# Patient Record
Sex: MALE | Race: WHITE | NOT HISPANIC OR LATINO | Employment: FULL TIME | URBAN - NONMETROPOLITAN AREA
[De-identification: names, ages, dates, MRNs, and addresses within clinical notes are randomized per-mention and may not be internally consistent; named-entity substitution may affect disease eponyms.]

---

## 2017-07-06 ENCOUNTER — APPOINTMENT (OUTPATIENT)
Dept: OCCUPATIONAL MEDICINE | Facility: OTHER | Age: 37
End: 2017-07-06

## 2017-07-06 PROCEDURE — 92499 UNLISTED OPH SVC/PROCEDURE: CPT

## 2017-07-06 PROCEDURE — 99080 SPECIAL REPORTS OR FORMS: CPT

## 2017-07-06 PROCEDURE — 99499 UNLISTED E&M SERVICE: CPT

## 2017-07-06 PROCEDURE — 99000 SPECIMEN HANDLING OFFICE-LAB: CPT

## 2017-07-06 PROCEDURE — 94010 BREATHING CAPACITY TEST: CPT

## 2017-07-06 PROCEDURE — 99199 UNLISTED SPECIAL SVC PX/RPRT: CPT

## 2017-07-13 ENCOUNTER — APPOINTMENT (OUTPATIENT)
Dept: OCCUPATIONAL MEDICINE | Facility: OTHER | Age: 37
End: 2017-07-13

## 2017-11-25 ENCOUNTER — HOSPITAL ENCOUNTER (EMERGENCY)
Facility: HOSPITAL | Age: 37
Discharge: HOME | End: 2017-11-25
Attending: FAMILY MEDICINE | Admitting: FAMILY MEDICINE
Payer: COMMERCIAL

## 2017-11-25 ENCOUNTER — APPOINTMENT (OUTPATIENT)
Dept: GENERAL RADIOLOGY | Facility: HOSPITAL | Age: 37
End: 2017-11-25
Attending: FAMILY MEDICINE
Payer: COMMERCIAL

## 2017-11-25 VITALS
DIASTOLIC BLOOD PRESSURE: 82 MMHG | OXYGEN SATURATION: 96 % | SYSTOLIC BLOOD PRESSURE: 125 MMHG | TEMPERATURE: 99.2 F | RESPIRATION RATE: 16 BRPM | HEART RATE: 80 BPM

## 2017-11-25 DIAGNOSIS — S42.001A CLOSED NONDISPLACED FRACTURE OF RIGHT CLAVICLE, UNSPECIFIED PART OF CLAVICLE, INITIAL ENCOUNTER: ICD-10-CM

## 2017-11-25 PROCEDURE — 25000128 H RX IP 250 OP 636: Performed by: FAMILY MEDICINE

## 2017-11-25 PROCEDURE — 99283 EMERGENCY DEPT VISIT LOW MDM: CPT | Performed by: FAMILY MEDICINE

## 2017-11-25 PROCEDURE — 96374 THER/PROPH/DIAG INJ IV PUSH: CPT

## 2017-11-25 PROCEDURE — 99284 EMERGENCY DEPT VISIT MOD MDM: CPT | Mod: 25

## 2017-11-25 PROCEDURE — 96376 TX/PRO/DX INJ SAME DRUG ADON: CPT

## 2017-11-25 PROCEDURE — 73030 X-RAY EXAM OF SHOULDER: CPT | Mod: TC,RT

## 2017-11-25 RX ORDER — HYDROMORPHONE HYDROCHLORIDE 1 MG/ML
0.5 INJECTION, SOLUTION INTRAMUSCULAR; INTRAVENOUS; SUBCUTANEOUS
Status: COMPLETED | OUTPATIENT
Start: 2017-11-25 | End: 2017-11-25

## 2017-11-25 RX ORDER — HYDROCODONE BITARTRATE AND ACETAMINOPHEN 5; 325 MG/1; MG/1
1-2 TABLET ORAL EVERY 4 HOURS PRN
Qty: 15 TABLET | Refills: 0 | Status: SHIPPED | OUTPATIENT
Start: 2017-11-25 | End: 2018-04-24

## 2017-11-25 RX ADMIN — HYDROMORPHONE HYDROCHLORIDE 0.5 MG: 1 INJECTION, SOLUTION INTRAMUSCULAR; INTRAVENOUS; SUBCUTANEOUS at 16:40

## 2017-11-25 RX ADMIN — HYDROMORPHONE HYDROCHLORIDE 0.5 MG: 1 INJECTION, SOLUTION INTRAMUSCULAR; INTRAVENOUS; SUBCUTANEOUS at 17:35

## 2017-11-25 RX ADMIN — HYDROMORPHONE HYDROCHLORIDE 0.5 MG: 1 INJECTION, SOLUTION INTRAMUSCULAR; INTRAVENOUS; SUBCUTANEOUS at 17:00

## 2017-11-25 NOTE — ED AVS SNAPSHOT
[facility]    [address]    [place] [address]    Phone:  [phone]                                       Frankie Troncoso   MRN: [id number]    Department:  [facility]   Date of Visit:  11/25/2017           Patient Information     Date Of Birth          1980        Your diagnoses for this visit were:     Closed nondisplaced fracture of right clavicle, unspecified part of clavicle, initial encounter        You were seen by Bibiana Myers MD.        Discharge Instructions         Collarbone Fracture  You have a break (fracture) in your collarbone (clavicle). This will cause swelling, pain, and bruising. The first few weeks will be the most painful. This is because deep breathing, coughing, or changing position from sitting to lying down may cause the broken ends to move slightly.   The fracture will heal in about 4 to 6 weeks. Most people can return to normal activities in about 3 months. In children, this injury will heal by reshaping the bone back to normal. In adults, a noticeable bump in the bone may remain.  Treatment is with a sling or a special type of arm sling called a shoulder immobilizer. This supports your arm and eases pain.  Home care  Follow these guidelines when caring for yourself or your child at home:    Put an ice pack on the injured area. Do this for 20 minutes every 1 to 2 hours on the first day. You can make an ice pack by wrapping a plastic bag of ice cubes in a thin towel. Keep using the ice pack 3 to 4 times a day for the next 2 days. Then use it as needed to ease pain and swelling.    If you were given a sling or shoulder immobilizer, wear it for comfort. You may take it off when you bathe or sleep. Take your arm out of the sling for a little while each day and move your shoulder, elbow, wrist, and hand to keep them from getting stiff.    Don t do any heavy lifting or raise the injured arm overhead until you are pain-free. Your child  shouldn t play sports or do physical education class for at least 4 weeks, or until the healthcare provider says it s OK to do so.    You may use acetaminophen or ibuprofen to control pain, unless another pain medicine was prescribed. If you have chronic liver or kidney disease, talk with your healthcare provider before using these medicines. Also talk with your provider if you ve had a stomach ulcer or gastrointestinal bleeding.    Your doctor may refer you to physical therapy for shoulder exercises once it starts to heal.    You may need surgery if the bones are out of place (displaced). Surgery will put them in better alignment while they heal. This leads to better strength when you have healed.  Follow-up care  Follow up with your healthcare provider within 1 week, or as advised. This is to be sure the bone is healing the way it should.  X-rays are occasionally taken of the fracture. You will be told of any new findings that may affect your care.  When to seek medical advice  Call your healthcare provider right away if any of these occur:    Swelling in your collarbone gets worse or the skin in the area becomes pale or discolored    Large area of bruising over the collarbone    Fingers become swollen, cold, blue, numb, or tingly    Shortness of breath, dizziness, or general weakness    Weakness or swelling in your arm    Any redness, drainage, or pus coming from the wound  Date Last Reviewed: 1/1/2017 2000-2017 [employer]. [address] [place], [place] [id number]. All rights reserved. This information is not intended as a substitute for professional medical care. Always follow your healthcare professional's instructions.             Review of your medicines      START taking        Dose / Directions Last dose taken    HYDROcodone-acetaminophen 5-325 MG per tablet   Commonly known as:  NORCO   Dose:  1-2 tablet   Quantity:  15 tablet        Take 1-2 tablets by mouth every 4 hours as needed for  moderate to severe pain   Refills:  0          Our records show that you are taking the medicines listed below. If these are incorrect, please call your family doctor or clinic.        Dose / Directions Last dose taken    albuterol 108 (90 BASE) MCG/ACT Inhaler   Commonly known as:  PROAIR HFA/PROVENTIL HFA/VENTOLIN HFA   Dose:  2 puff   Quantity:  1 Inhaler        Inhale 2 puffs into the lungs every 6 hours as needed for shortness of breath / dyspnea   Refills:  prn        blood glucose monitoring test strip   Commonly known as:  ACCU-CHEK CHERELLE   Quantity:  1 Box        USE STRIP TO CHECK GLUCOSE TWICE DAILY   Refills:  5        CINNAMON PO        Refills:  0        fish oil-omega-3 fatty acids 1000 MG capsule   Dose:  1 capsule        Take 1 capsule by mouth 2 times daily   Refills:  0        insulin glargine 100 UNIT/ML injection   Commonly known as:  LANTUS        Inject Subcutaneous At Bedtime   Refills:  0        LISINOPRIL PO   Dose:  2.5 mg        Take 2.5 mg by mouth daily   Refills:  0        NOVOLOG SC        Refills:  0        simvastatin 40 MG tablet   Commonly known as:  ZOCOR   Dose:  1 tablet        Take 1 tablet by mouth every evening   Refills:  0        VITAMIN E        Refills:  0                Prescriptions were sent or printed at these locations (1 Prescription)                   [id number] - [place], [place] - [address] [facility] [id number] [facility] [address], [place] [address]    Telephone:  [phone]   Fax:  [phone]   Hours:                  Printed at Department/Unit printer (1 of 1)         HYDROcodone-acetaminophen (NORCO) 5-325 MG per tablet                Procedures and tests performed during your visit     Shoulder XR, G/E 3 views, right      Orders Needing Specimen Collection     None      Pending Results     No orders found from 11/23/2017 to 11/26/2017.            Pending Culture Results     No orders found from 11/23/2017 to 11/26/2017.            Thank you for choosing [facility]      "  Thank you for choosing [facility] for your care. Our goal is always to provide you with excellent care. Hearing back from our patients is one way we can continue to improve our services. Please take a few minutes to complete the written survey that you may receive in the mail after you visit with us. Thank you!        [facility] Information     [employer] lets you send messages to your doctor, view your test results, renew your prescriptions, schedule appointments and more. To sign up, go to [email or web address] . Click on \"Log in\" on the left side of the screen, which will take you to the Welcome page. Then click on \"Sign up Now\" on the right side of the page.     You will be asked to enter the access code listed below, as well as some personal information. Please follow the directions to create your username and password.     Your access code is: [email or web address]  Expires: 2018  5:35 PM     Your access code will  in 90 days. If you need help or a new code, please call your [facility] clinic or [phone].        Care EveryWhere ID     This is your Care EveryWhere ID. This could be used by other organizations to access your [facility] medical records  [id number]        Equal Access to Services     TIFFANIE DIAZ AH: Tory meltono Somadelyn, waaxda luqadaha, qaybta kaalmada adeegyada, berenice lott. So [facility] [phone].    ATENCIÓN: Si habla español, tiene a bautista disposición servicios gratuitos de asistencia lingüística. Llame al [phone].    We comply with applicable federal civil rights laws and [place] laws. We do not discriminate on the basis of race, color, national origin, age, disability, sex, sexual orientation, or gender identity.            After Visit Summary       This is your record. Keep this with you and show to your community pharmacist(s) and doctor(s) at your next visit.                  "
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
## 2017-11-25 NOTE — DISCHARGE INSTRUCTIONS
Collarbone Fracture  You have a break (fracture) in your collarbone (clavicle). This will cause swelling, pain, and bruising. The first few weeks will be the most painful. This is because deep breathing, coughing, or changing position from sitting to lying down may cause the broken ends to move slightly.   The fracture will heal in about 4 to 6 weeks. Most people can return to normal activities in about 3 months. In children, this injury will heal by reshaping the bone back to normal. In adults, a noticeable bump in the bone may remain.  Treatment is with a sling or a special type of arm sling called a shoulder immobilizer. This supports your arm and eases pain.  Home care  Follow these guidelines when caring for yourself or your child at home:    Put an ice pack on the injured area. Do this for 20 minutes every 1 to 2 hours on the first day. You can make an ice pack by wrapping a plastic bag of ice cubes in a thin towel. Keep using the ice pack 3 to 4 times a day for the next 2 days. Then use it as needed to ease pain and swelling.    If you were given a sling or shoulder immobilizer, wear it for comfort. You may take it off when you bathe or sleep. Take your arm out of the sling for a little while each day and move your shoulder, elbow, wrist, and hand to keep them from getting stiff.    Don t do any heavy lifting or raise the injured arm overhead until you are pain-free. Your child shouldn t play sports or do physical education class for at least 4 weeks, or until the healthcare provider says it s OK to do so.    You may use acetaminophen or ibuprofen to control pain, unless another pain medicine was prescribed. If you have chronic liver or kidney disease, talk with your healthcare provider before using these medicines. Also talk with your provider if you ve had a stomach ulcer or gastrointestinal bleeding.    Your doctor may refer you to physical therapy for shoulder exercises once it starts to heal.    You  may need surgery if the bones are out of place (displaced). Surgery will put them in better alignment while they heal. This leads to better strength when you have healed.  Follow-up care  Follow up with your healthcare provider within 1 week, or as advised. This is to be sure the bone is healing the way it should.  X-rays are occasionally taken of the fracture. You will be told of any new findings that may affect your care.  When to seek medical advice  Call your healthcare provider right away if any of these occur:    Swelling in your collarbone gets worse or the skin in the area becomes pale or discolored    Large area of bruising over the collarbone    Fingers become swollen, cold, blue, numb, or tingly    Shortness of breath, dizziness, or general weakness    Weakness or swelling in your arm    Any redness, drainage, or pus coming from the wound  Date Last Reviewed: 1/1/2017 2000-2017 [employer]. [address] [place], [place] [address]. All rights reserved. This information is not intended as a substitute for professional medical care. Always follow your healthcare professional's instructions.

## 2017-11-25 NOTE — ED AVS SNAPSHOT
[facility]    [address] [place] [address]    Phone:  [phone]                                       Frankie Troncoso   MRN: [id number]    Department:  [facility]   Date of Visit:  11/25/2017           After Visit Summary Signature Page     I have received my discharge instructions, and my questions have been answered. I have discussed any challenges I see with this plan with the nurse or doctor.    ..........................................................................................................................................  Patient/Patient Representative Signature      ..........................................................................................................................................  Patient Representative Print Name and Relationship to Patient    ..................................................               ................................................  Date                                            Time    ..........................................................................................................................................  Reviewed by Signature/Title    ...................................................              ..............................................  Date                                                            Time
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
## 2018-04-24 ENCOUNTER — HOSPITAL ENCOUNTER (EMERGENCY)
Facility: HOSPITAL | Age: 38
Discharge: HOME | End: 2018-04-24
Attending: PHYSICIAN ASSISTANT | Admitting: PHYSICIAN ASSISTANT
Payer: COMMERCIAL

## 2018-04-24 VITALS
RESPIRATION RATE: 19 BRPM | SYSTOLIC BLOOD PRESSURE: 136 MMHG | OXYGEN SATURATION: 97 % | HEART RATE: 95 BPM | DIASTOLIC BLOOD PRESSURE: 89 MMHG | TEMPERATURE: 96.9 F

## 2018-04-24 DIAGNOSIS — M76.892 TENDINITIS OF LEFT KNEE: ICD-10-CM

## 2018-04-24 PROCEDURE — 99213 OFFICE O/P EST LOW 20 MIN: CPT | Performed by: PHYSICIAN ASSISTANT

## 2018-04-24 PROCEDURE — G0463 HOSPITAL OUTPT CLINIC VISIT: HCPCS

## 2018-04-24 RX ORDER — PREDNISONE 20 MG/1
TABLET ORAL
Qty: 10 TABLET | Refills: 0 | Status: SHIPPED | OUTPATIENT
Start: 2018-04-24 | End: 2018-11-01

## 2018-04-24 ASSESSMENT — ENCOUNTER SYMPTOMS
CARDIOVASCULAR NEGATIVE: 1
PSYCHIATRIC NEGATIVE: 1
MYALGIAS: 1
CONSTITUTIONAL NEGATIVE: 1

## 2018-04-24 NOTE — ED AVS SNAPSHOT
[facility]    [address] [place] [address]    Phone:  [phone]                                       Frankie Troncoso   MRN: [id number]    Department:  [facility]   Date of Visit:  4/24/2018           After Visit Summary Signature Page     I have received my discharge instructions, and my questions have been answered. I have discussed any challenges I see with this plan with the nurse or doctor.    ..........................................................................................................................................  Patient/Patient Representative Signature      ..........................................................................................................................................  Patient Representative Print Name and Relationship to Patient    ..................................................               ................................................  Date                                            Time    ..........................................................................................................................................  Reviewed by Signature/Title    ...................................................              ..............................................  Date                                                            Time

## 2018-04-24 NOTE — ED PROVIDER NOTES
History     Chief Complaint   Patient presents with     Knee Pain     left knee pain for 3 weeks     The history is provided by the patient. No  was used.     Frankie Troncoso is a 38 year old male who       Problem List:    Patient Active Problem List    Diagnosis Date Noted     Diabetes mellitus type 1 (H) 07/16/2014     Priority: Medium     Allergic rhinitis due to pollen 01/21/2014     Priority: Medium     Seasonal allergic rhinitis 12/03/2013     Priority: Medium     Sleep-disordered breathing 12/03/2013     Priority: Medium        Past Medical History:    History reviewed. No pertinent past medical history.    Past Surgical History:    History reviewed. No pertinent surgical history.    Family History:    Family History   Problem Relation Age of Onset     Alcohol/Drug Father      alcoholism     C.A.D. Father      CEREBROVASCULAR DISEASE Father      CVA     Allergies Father      Asthma Father      HEART DISEASE Other      heart disease - family h/o       Social History:  Marital Status:   [2]  Social History   Substance Use Topics     Smoking status: Former Smoker     Packs/day: 2.00     Years: 10.00     Types: Cigarettes     Smokeless tobacco: Never Used     Alcohol use Yes        Medications:      albuterol (PROAIR HFA, PROVENTIL HFA, VENTOLIN HFA) 108 (90 BASE) MCG/ACT inhaler   blood glucose (ACCU-CHEK CHERELLE) test strip   CINNAMON PO   fish oil-omega-3 fatty acids (FISH OIL) 1000 MG capsule   Insulin Aspart (NOVOLOG SC)   insulin glargine (LANTUS) 100 UNIT/ML injection   LISINOPRIL PO   predniSONE (DELTASONE) 20 MG tablet   simvastatin (ZOCOR) 40 MG tablet   VITAMIN E         Review of Systems   Constitutional: Negative.    Cardiovascular: Negative.    Musculoskeletal: Positive for gait problem and myalgias.   Psychiatric/Behavioral: Negative.        Physical Exam   BP: 136/89  Pulse: 95  Temp: 96.9  F (36.1  C)  Resp: 19  SpO2: 97 %      Physical Exam   Constitutional: He is  oriented to person, place, and time. He appears well-developed and well-nourished. No distress.   Cardiovascular: Normal rate.    Pulmonary/Chest: Effort normal.   Musculoskeletal:   Left knee: moderate TTP to distal medial quad. Neg v/v/d. +AFROM w/ pain. 4/5 strength due to pain. No e/e/e/e. M/n/v intact   Neurological: He is alert and oriented to person, place, and time.   Skin: He is not diaphoretic.   Psychiatric: He has a normal mood and affect.   Nursing note and vitals reviewed.      ED Course     ED Course     Procedures            Assessments & Plan (with Medical Decision Making)     I have reviewed the nursing notes.    I have reviewed the findings, diagnosis, plan and need for follow up with the patient.      Discharge Medication List as of 4/24/2018  9:55 AM      START taking these medications    Details   predniSONE (DELTASONE) 20 MG tablet Take two tablets (= 40mg) each day for 5 (five) days, Disp-10 tablet, R-0, E-Prescribe             Final diagnoses:   Tendinitis of left knee           Elevate injured area above heart as often as possible and when resting.  Apply ice at least three times a day x 20 minutes.   Patient verbally educated and given appropriate education sheets for the diagnoses and has no questions.  Take medications as directed.   Follow up with your Primary Care provider if symptoms increase or if concerns develop, return to the ER  Erin Pierce Certified   Physician Assistant  4/24/2018  6:52 PM  URGENT CARE CLINIC    4/24/2018   [facility]     Erin Pierce PA  04/24/18 [id number]

## 2018-04-24 NOTE — ED AVS SNAPSHOT
[facility]    [address] [address]    Phone:  [phone]                                       Frankie Troncoso   MRN: [id number]    Department:  [facility]   Date of Visit:  4/24/2018           Patient Information     Date Of Birth          1980        Your diagnoses for this visit were:     Tendinitis of left knee        You were seen by Erin Pierce PA.      Follow-up Information     Please follow up.    [facility]  [facility]. You may need Physical Therapy.        Follow up with [facility].    Specialty:  EMERGENCY MEDICINE    Why:  If further concerns develop    Contact information:    [address] [address] [phone]    Additional information:    From [facility]: Take [address]. Turn left at [address]. Turn left at the first stoplight on [address]. At the first stop sign, take a right onto [address]. Take a left into the parking lot and continue through until you reach the North enterance of the building.       From [place]: Take [address]. Take the [address] ramp towards [place]. Turn left onto [address]. Take a slight right onto [address] [email or web address] Turn left at the first stoplight on [address]. At the first stop sign, take a right onto [address]. Take a left into the parking lot and continue through until you reach the North enterance of the building.       From [place]: Take [address]. Take a right at [address]. At the first stop sign, take a right onto [address]. Take a left into the parking lot and continue through until you reach the North enterance of the building.       Discharge References/Attachments     KNEE PAIN (ENGLISH)         Review of your medicines      START taking        Dose / Directions Last dose taken    predniSONE 20 MG tablet   Commonly known as:  DELTASONE   Quantity:   10 tablet        Take two tablets (= 40mg) each day for 5 (five) days   Refills:  0          Our records show that you are taking the medicines listed below. If these are incorrect, please call your family doctor or clinic.        Dose / Directions Last dose taken    albuterol 108 (90 Base) MCG/ACT Inhaler   Commonly known as:  PROAIR HFA/PROVENTIL HFA/VENTOLIN HFA   Dose:  2 puff   Quantity:  1 Inhaler        Inhale 2 puffs into the lungs every 6 hours as needed for shortness of breath / dyspnea   Refills:  prn        blood glucose monitoring test strip   Commonly known as:  ACCU-CHEK CHERELLE   Quantity:  1 Box        USE STRIP TO CHECK GLUCOSE TWICE DAILY   Refills:  5        CINNAMON PO        Refills:  0        fish oil-omega-3 fatty acids 1000 MG capsule   Dose:  1 capsule        Take 1 capsule by mouth 2 times daily   Refills:  0        insulin glargine 100 UNIT/ML injection   Commonly known as:  LANTUS        Inject Subcutaneous At Bedtime   Refills:  0        LISINOPRIL PO   Dose:  2.5 mg        Take 2.5 mg by mouth daily   Refills:  0        NOVOLOG SC        Refills:  0        simvastatin 40 MG tablet   Commonly known as:  ZOCOR   Dose:  1 tablet        Take 1 tablet by mouth every evening   Refills:  0        VITAMIN E        Refills:  0                Prescriptions were sent or printed at these locations (1 Prescription)                   [facility] [id number] - [address] [facility] [address]   [address] [facility] [address], [place] [address]    Telephone:  [phone]   Fax:  [phone]   Hours:                  E-Prescribed (1 of 1)         predniSONE (DELTASONE) 20 MG tablet                Orders Needing Specimen Collection     None      Pending Results     No orders found from 4/22/2018 to 4/25/2018.            Pending Culture Results     No orders found from 4/22/2018 to 4/25/2018.            Thank you for choosing [facility]       Thank you for choosing [facility] for your care. Our goal is always to provide you with  "excellent care. Hearing back from our patients is one way we can continue to improve our services. Please take a few minutes to complete the written survey that you may receive in the mail after you visit with us. Thank you!        Smartpay Information     Smartpay lets you send messages to your doctor, view your test results, renew your prescriptions, schedule appointments and more. To sign up, go to [email or web address] . Click on \"Log in\" on the left side of the screen, which will take you to the Welcome page. Then click on \"Sign up Now\" on the right side of the page.     You will be asked to enter the access code listed below, as well as some personal information. Please follow the directions to create your username and password.     Your access code is: [email or web address]  Expires: 2018  9:55 AM     Your access code will  in 90 days. If you need help or a new code, please call your [facility] clinic or [phone].        Care EveryWhere ID     This is your Care EveryWhere ID. This could be used by other organizations to access your [facility] medical records  [id number]        Equal Access to Services     TIFFANIE DIAZ AH: Hadii elmer Jameson, waashleigh clements, qaybta kaalmadamián cornell, berenice lott. So [facility] [phone].    ATENCIÓN: Si habla español, tiene a bautista disposición servicios gratuitos de asistencia lingüística. Carol al [phone].    We comply with applicable federal civil rights laws and [place] laws. We do not discriminate on the basis of race, color, national origin, age, disability, sex, sexual orientation, or gender identity.            After Visit Summary       This is your record. Keep this with you and show to your community pharmacist(s) and doctor(s) at your next visit.                  "

## 2018-05-17 ENCOUNTER — APPOINTMENT (OUTPATIENT)
Dept: OCCUPATIONAL MEDICINE | Facility: OTHER | Age: 38
End: 2018-05-17

## 2018-05-17 PROCEDURE — 99000 SPECIMEN HANDLING OFFICE-LAB: CPT

## 2018-05-17 PROCEDURE — 99199 UNLISTED SPECIAL SVC PX/RPRT: CPT

## 2018-11-01 ENCOUNTER — APPOINTMENT (OUTPATIENT)
Dept: CT IMAGING | Facility: HOSPITAL | Age: 38
End: 2018-11-01
Attending: FAMILY MEDICINE

## 2018-11-01 ENCOUNTER — HOSPITAL ENCOUNTER (EMERGENCY)
Facility: HOSPITAL | Age: 38
Discharge: HOME | End: 2018-11-01
Attending: FAMILY MEDICINE | Admitting: FAMILY MEDICINE

## 2018-11-01 VITALS
SYSTOLIC BLOOD PRESSURE: 122 MMHG | OXYGEN SATURATION: 96 % | TEMPERATURE: 97 F | RESPIRATION RATE: 12 BRPM | DIASTOLIC BLOOD PRESSURE: 77 MMHG

## 2018-11-01 DIAGNOSIS — K52.9 GASTROENTERITIS: ICD-10-CM

## 2018-11-01 LAB
ALBUMIN SERPL-MCNC: 3.7 G/DL (ref 3.4–5)
ALBUMIN UR-MCNC: NEGATIVE MG/DL
ALP SERPL-CCNC: 71 U/L (ref 40–150)
ALT SERPL W P-5'-P-CCNC: 33 U/L (ref 0–70)
ANION GAP SERPL CALCULATED.3IONS-SCNC: 8 MMOL/L (ref 3–14)
APPEARANCE UR: CLEAR
AST SERPL W P-5'-P-CCNC: 25 U/L (ref 0–45)
BACTERIA #/AREA URNS HPF: ABNORMAL /HPF
BASOPHILS # BLD AUTO: 0 10E9/L (ref 0–0.2)
BASOPHILS NFR BLD AUTO: 0.2 %
BILIRUB SERPL-MCNC: 0.4 MG/DL (ref 0.2–1.3)
BILIRUB UR QL STRIP: NEGATIVE
BUN SERPL-MCNC: 12 MG/DL (ref 7–30)
CALCIUM SERPL-MCNC: 8.8 MG/DL (ref 8.5–10.1)
CHLORIDE SERPL-SCNC: 105 MMOL/L (ref 94–109)
CO2 SERPL-SCNC: 23 MMOL/L (ref 20–32)
COLOR UR AUTO: YELLOW
CREAT SERPL-MCNC: 0.94 MG/DL (ref 0.66–1.25)
DIFFERENTIAL METHOD BLD: NORMAL
EOSINOPHIL # BLD AUTO: 0.4 10E9/L (ref 0–0.7)
EOSINOPHIL NFR BLD AUTO: 3.6 %
ERYTHROCYTE [DISTWIDTH] IN BLOOD BY AUTOMATED COUNT: 14.1 % (ref 10–15)
EST. AVERAGE GLUCOSE BLD GHB EST-MCNC: 151 MG/DL
GFR SERPL CREATININE-BSD FRML MDRD: 89 ML/MIN/1.7M2
GLUCOSE SERPL-MCNC: 269 MG/DL (ref 70–99)
GLUCOSE UR STRIP-MCNC: >1000 MG/DL
HBA1C MFR BLD: 6.9 % (ref 0–5.6)
HCT VFR BLD AUTO: 43 % (ref 40–53)
HGB BLD-MCNC: 14.4 G/DL (ref 13.3–17.7)
HGB UR QL STRIP: NEGATIVE
IMM GRANULOCYTES # BLD: 0 10E9/L (ref 0–0.4)
IMM GRANULOCYTES NFR BLD: 0.3 %
KETONES UR STRIP-MCNC: 10 MG/DL
LEUKOCYTE ESTERASE UR QL STRIP: NEGATIVE
LYMPHOCYTES # BLD AUTO: 2 10E9/L (ref 0.8–5.3)
LYMPHOCYTES NFR BLD AUTO: 19.3 %
MCH RBC QN AUTO: 29 PG (ref 26.5–33)
MCHC RBC AUTO-ENTMCNC: 33.5 G/DL (ref 31.5–36.5)
MCV RBC AUTO: 87 FL (ref 78–100)
MONOCYTES # BLD AUTO: 0.8 10E9/L (ref 0–1.3)
MONOCYTES NFR BLD AUTO: 7.6 %
NEUTROPHILS # BLD AUTO: 7.2 10E9/L (ref 1.6–8.3)
NEUTROPHILS NFR BLD AUTO: 69 %
NITRATE UR QL: NEGATIVE
NRBC # BLD AUTO: 0 10*3/UL
NRBC BLD AUTO-RTO: 0 /100
PH UR STRIP: 6.5 PH (ref 4.7–8)
PLATELET # BLD AUTO: 329 10E9/L (ref 150–450)
POTASSIUM SERPL-SCNC: 4.4 MMOL/L (ref 3.4–5.3)
PROT SERPL-MCNC: 7.7 G/DL (ref 6.8–8.8)
RBC # BLD AUTO: 4.97 10E12/L (ref 4.4–5.9)
RBC #/AREA URNS AUTO: 0 /HPF (ref 0–2)
SODIUM SERPL-SCNC: 136 MMOL/L (ref 133–144)
SOURCE: ABNORMAL
SP GR UR STRIP: 1.03 (ref 1–1.03)
UROBILINOGEN UR STRIP-MCNC: NORMAL MG/DL (ref 0–2)
WBC # BLD AUTO: 10.5 10E9/L (ref 4–11)
WBC #/AREA URNS AUTO: <1 /HPF (ref 0–5)

## 2018-11-01 PROCEDURE — 83036 HEMOGLOBIN GLYCOSYLATED A1C: CPT | Performed by: FAMILY MEDICINE

## 2018-11-01 PROCEDURE — 99284 EMERGENCY DEPT VISIT MOD MDM: CPT | Performed by: FAMILY MEDICINE

## 2018-11-01 PROCEDURE — 25000128 H RX IP 250 OP 636: Performed by: FAMILY MEDICINE

## 2018-11-01 PROCEDURE — 99284 EMERGENCY DEPT VISIT MOD MDM: CPT | Mod: 25

## 2018-11-01 PROCEDURE — 96361 HYDRATE IV INFUSION ADD-ON: CPT

## 2018-11-01 PROCEDURE — 81001 URINALYSIS AUTO W/SCOPE: CPT | Performed by: FAMILY MEDICINE

## 2018-11-01 PROCEDURE — 36415 COLL VENOUS BLD VENIPUNCTURE: CPT | Performed by: FAMILY MEDICINE

## 2018-11-01 PROCEDURE — 40000788 ZZHCL STATISTIC ESTIMATED AVERAGE GLUCOSE: Performed by: FAMILY MEDICINE

## 2018-11-01 PROCEDURE — 96360 HYDRATION IV INFUSION INIT: CPT

## 2018-11-01 PROCEDURE — 74176 CT ABD & PELVIS W/O CONTRAST: CPT | Mod: TC

## 2018-11-01 PROCEDURE — 80053 COMPREHEN METABOLIC PANEL: CPT | Performed by: FAMILY MEDICINE

## 2018-11-01 PROCEDURE — 85025 COMPLETE CBC W/AUTO DIFF WBC: CPT | Performed by: FAMILY MEDICINE

## 2018-11-01 RX ORDER — ONDANSETRON 4 MG/1
4 TABLET, ORALLY DISINTEGRATING ORAL EVERY 8 HOURS PRN
Qty: 20 TABLET | Refills: 0 | Status: SHIPPED | OUTPATIENT
Start: 2018-11-01 | End: 2021-02-18

## 2018-11-01 RX ORDER — ONDANSETRON 2 MG/ML
4 INJECTION INTRAMUSCULAR; INTRAVENOUS ONCE
Status: DISCONTINUED | OUTPATIENT
Start: 2018-11-01 | End: 2018-11-01 | Stop reason: HOSPADM

## 2018-11-01 RX ADMIN — SODIUM CHLORIDE 1000 ML: 9 INJECTION, SOLUTION INTRAVENOUS at 02:58

## 2018-11-01 NOTE — ED AVS SNAPSHOT
[facility]    [address]    [place] [address]    Phone:  [phone]                                       Frankie Troncoso   MRN: [id number]    Department:  [facility]   Date of Visit:  11/1/2018           Patient Information     Date Of Birth          1980        Your diagnoses for this visit were:     Gastroenteritis        You were seen by Yosvany De Jesus DO.      Follow-up Information     Follow up with Clinic, [facility] In 3 days.    Contact information:    [address]  [address]  [place] [address]  [phone]          Discharge Instructions       Final diagnoses:   Gastroenteritis     Drink four pints (64 fluid ounces) of plain water daily. Eat dry plain cheerios and applesauce until feeling better. Then progress diet back to normal as tolerated. If not feeling 100% better in 48 hours, follow up with your doctor in 48 to 72 hours. In the meanwhile, if worsening pain or symptoms of illness, immediately return to the emergency department.         Review of your medicines      START taking        Dose / Directions Last dose taken    ondansetron 4 MG ODT tab   Commonly known as:  ZOFRAN ODT   Dose:  4 mg   Quantity:  20 tablet        Take 1 tablet (4 mg) by mouth every 8 hours as needed for nausea or vomiting   Refills:  0          Our records show that you are taking the medicines listed below. If these are incorrect, please call your family doctor or clinic.        Dose / Directions Last dose taken    albuterol 108 (90 Base) MCG/ACT inhaler   Commonly known as:  PROAIR HFA/PROVENTIL HFA/VENTOLIN HFA   Dose:  2 puff   Quantity:  1 Inhaler        Inhale 2 puffs into the lungs every 6 hours as needed for shortness of breath / dyspnea   Refills:  prn        blood glucose monitoring test strip   Commonly known as:  ACCU-CHEK CHERELLE   Quantity:  1 Box        USE STRIP TO CHECK GLUCOSE TWICE DAILY   Refills:  5        CINNAMON PO        Refills:  0         fish oil-omega-3 fatty acids 1000 MG capsule   Dose:  1 capsule        Take 1 capsule by mouth 2 times daily   Refills:  0        insulin glargine 100 UNIT/ML injection   Commonly known as:  LANTUS        Inject Subcutaneous At Bedtime   Refills:  0        LISINOPRIL PO   Dose:  2.5 mg        Take 2.5 mg by mouth daily   Refills:  0        NOVOLOG SC        Refills:  0        simvastatin 40 MG tablet   Commonly known as:  ZOCOR   Dose:  1 tablet        Take 1 tablet by mouth every evening   Refills:  0        VITAMIN E        Refills:  0                Prescriptions were sent or printed at these locations (1 Prescription)                   Other Prescriptions                Printed at Department/Unit printer (1 of 1)         ondansetron (ZOFRAN ODT) 4 MG ODT tab                Procedures and tests performed during your visit     Procedure/Test Number of Times Performed    Abd/pelvis CT - no contrast - Stone Protocol 1    CBC with platelets differential 1    Comprehensive metabolic panel 1    Estimated Average Glucose 2    Hemoglobin A1c 1    UA with Microscopic reflex to Culture 1      Orders Needing Specimen Collection     None      Pending Results     Date and Time Order Name Status Description    11/1/2018 0259 Estimated Average Glucose In process     11/1/2018 0257 Abd/pelvis CT - no contrast - Stone Protocol In process             Pending Culture Results     No orders found from 10/30/2018 to 11/2/2018.            Thank you for choosing [facility]       Thank you for choosing [facility] for your care. Our goal is always to provide you with excellent care. Hearing back from our patients is one way we can continue to improve our services. Please take a few minutes to complete the written survey that you may receive in the mail after you visit with us. Thank you!        Publicatehart Information     RAZ Mobile lets you send messages to your doctor, view your test results, renew your prescriptions, schedule appointments  "and more. To sign up, go to [email or web address] . Click on \"Log in\" on the left side of the screen, which will take you to the Welcome page. Then click on \"Sign up Now\" on the right side of the page.     You will be asked to enter the access code listed below, as well as some personal information. Please follow the directions to create your username and password.     Your access code is: [email or web address]  Expires: 2019  5:42 AM     Your access code will  in 90 days. If you need help or a new code, please call your [facility] clinic or [phone].        Care EveryWhere ID     This is your Care EveryWhere ID. This could be used by other organizations to access your [facility] medical records  [id number]        Equal Access to Services     TIFFANIE DIAZ : Tory Jameson, maria del carmen clements, fiordaliza cornell, berenice cardenas . So [facility] [phone].    ATENCIÓN: Si habla español, tiene a bautista disposición servicios gratuitos de asistencia lingüística. Llame al [phone].    We comply with applicable federal civil rights laws and [place] laws. We do not discriminate on the basis of race, color, national origin, age, disability, sex, sexual orientation, or gender identity.            After Visit Summary       This is your record. Keep this with you and show to your community pharmacist(s) and doctor(s) at your next visit.                  "

## 2018-11-01 NOTE — ED AVS SNAPSHOT
[facility]    [address] [place] [address]    Phone:  [phone]                                       Frankie Trnocoso   MRN: [id number]    Department:  [facility]   Date of Visit:  11/1/2018           After Visit Summary Signature Page     I have received my discharge instructions, and my questions have been answered. I have discussed any challenges I see with this plan with the nurse or doctor.    ..........................................................................................................................................  Patient/Patient Representative Signature      ..........................................................................................................................................  Patient Representative Print Name and Relationship to Patient    ..................................................               ................................................  Date                                   Time    ..........................................................................................................................................  Reviewed by Signature/Title    ...................................................              ..............................................  Date                                               Time          22EPIC Rev 08/18

## 2018-11-01 NOTE — ED PROVIDER NOTES
History     Chief Complaint   Patient presents with     Abdominal Pain     RLQ pain woke pt at [place], has had nausea, vomiting, and diarrhea for last 2 hours     HPI  Frankie Troncoso is a 38 year old male who went to bed feeling well and woke up at midnight due to severe right lower quadrant abdominal pain. He had diarrhea and nausea with vomiting for the next two hours. He currently is still nauseated and still has right lower quadrant abdominal pain. The pain does not radiate and movement makes the pain worse.    Problem List:    Patient Active Problem List    Diagnosis Date Noted     Diabetes mellitus type 1 (H) 07/16/2014     Priority: Medium     Allergic rhinitis due to pollen 01/21/2014     Priority: Medium     Seasonal allergic rhinitis 12/03/2013     Priority: Medium     Sleep-disordered breathing 12/03/2013     Priority: Medium        Past Medical History:    Type 1 Diabetic  Hypertension  Lipid metabolism disorder  GERD    Past Surgical History:    Circumcision    Family History:    Family History   Problem Relation Age of Onset     Alcohol/Drug Father      alcoholism     C.A.D. Father      Cerebrovascular Disease Father      CVA     Allergies Father      Asthma Father      HEART DISEASE Other      heart disease - family h/o       Social History:  Marital Status:   [2]  Social History   Substance Use Topics     Smoking status: Former Smoker     Packs/day: 2.00     Years: 10.00     Types: Cigarettes     Smokeless tobacco: Never Used     Alcohol use Yes        Medications:      albuterol (PROAIR HFA, PROVENTIL HFA, VENTOLIN HFA) 108 (90 BASE) MCG/ACT inhaler   blood glucose (ACCU-CHEK CHERELLE) test strip   CINNAMON PO   fish oil-omega-3 fatty acids (FISH OIL) 1000 MG capsule   Insulin Aspart (NOVOLOG SC)   insulin glargine (LANTUS) 100 UNIT/ML injection   LISINOPRIL PO   simvastatin (ZOCOR) 40 MG tablet   VITAMIN E         Review of Systems  Denies chest pain, shortness of breath, and problems with  urination. Reports chills. Otherwise per HPI    Physical Exam   BP: 135/97  Heart Rate: 92  Temp: 97.8  F (36.6  C)  Resp: 24  SpO2: 96 %      Physical Exam   Constitutional:   Looks like he is hurting   HENT:   Mouth/Throat: Oropharynx is clear and moist.   Eyes: Pupils are equal, round, and reactive to light.   Cardiovascular: Normal rate and regular rhythm.    Pulmonary/Chest: Effort normal and breath sounds normal.   Abdominal:   Soft and tendern over RLQ only. Rebound tenderness left to right. Peritoneal signs with right leg motion at the hip and with heel tap.   Neurological: He is alert.   Skin: Skin is warm and dry.   Psychiatric: He has a normal mood and affect. His behavior is normal. Judgment and thought content normal.       ED Course     Procedures     CT of abdomen and pelvis without contrast provides no explanation for patient's symptoms and specifically shows no evidence of appendicitis.       Discussed CT findings with Dr. Noriega who also took the time to look at the CT exam. He did not see evidence of appendicitis either.    Results for orders placed or performed during the hospital encounter of 11/01/18 (from the past 24 hour(s))   CBC with platelets differential   Result Value Ref Range    WBC 10.5 4.0 - 11.0 10e9/L    RBC Count 4.97 4.4 - 5.9 10e12/L    Hemoglobin 14.4 13.3 - 17.7 g/dL    Hematocrit 43.0 40.0 - 53.0 %    MCV 87 78 - 100 fl    MCH 29.0 26.5 - 33.0 pg    MCHC 33.5 31.5 - 36.5 g/dL    RDW 14.1 10.0 - 15.0 %    Platelet Count 329 150 - 450 10e9/L    Diff Method Automated Method     % Neutrophils 69.0 %    % Lymphocytes 19.3 %    % Monocytes 7.6 %    % Eosinophils 3.6 %    % Basophils 0.2 %    % Immature Granulocytes 0.3 %    Nucleated RBCs 0 0 /100    Absolute Neutrophil 7.2 1.6 - 8.3 10e9/L    Absolute Lymphocytes 2.0 0.8 - 5.3 10e9/L    Absolute Monocytes 0.8 0.0 - 1.3 10e9/L    Absolute Eosinophils 0.4 0.0 - 0.7 10e9/L    Absolute Basophils 0.0 0.0 - 0.2 10e9/L    Abs Immature  Granulocytes 0.0 0 - 0.4 10e9/L    Absolute Nucleated RBC 0.0    Comprehensive metabolic panel   Result Value Ref Range    Sodium 136 133 - 144 mmol/L    Potassium 4.4 3.4 - 5.3 mmol/L    Chloride 105 94 - 109 mmol/L    Carbon Dioxide 23 20 - 32 mmol/L    Anion Gap 8 3 - 14 mmol/L    Glucose 269 (H) 70 - 99 mg/dL    Urea Nitrogen 12 7 - 30 mg/dL    Creatinine 0.94 0.66 - 1.25 mg/dL    GFR Estimate 89 >60 mL/min/1.7m2    GFR Estimate If Black >90 >60 mL/min/1.7m2    Calcium 8.8 8.5 - 10.1 mg/dL    Bilirubin Total 0.4 0.2 - 1.3 mg/dL    Albumin 3.7 3.4 - 5.0 g/dL    Protein Total 7.7 6.8 - 8.8 g/dL    Alkaline Phosphatase 71 40 - 150 U/L    ALT 33 0 - 70 U/L    AST 25 0 - 45 U/L   Hemoglobin A1c   Result Value Ref Range    Hemoglobin A1C 6.9 (H) 0 - 5.6 %   Estimated Average Glucose   Result Value Ref Range    Estimated Average Glucose 151 mg/dL   UA with Microscopic reflex to Culture   Result Value Ref Range    Color Urine Yellow     Appearance Urine Clear     Glucose Urine >1000 (A) NEG^Negative mg/dL    Bilirubin Urine Negative NEG^Negative    Ketones Urine 10 (A) NEG^Negative mg/dL    Specific Gravity Urine 1.026 1.003 - 1.035    Blood Urine Negative NEG^Negative    pH Urine 6.5 4.7 - 8.0 pH    Protein Albumin Urine Negative NEG^Negative mg/dL    Urobilinogen mg/dL Normal 0.0 - 2.0 mg/dL    Nitrite Urine Negative NEG^Negative    Leukocyte Esterase Urine Negative NEG^Negative    Source Unspecified Urine     WBC Urine <1 0 - 5 /HPF    RBC Urine 0 0 - 2 /HPF    Bacteria Urine None (A) NEG^Negative /HPF       Medications   ondansetron (ZOFRAN) injection 4 mg (not administered)   0.9% sodium chloride BOLUS (1,000 mLs Intravenous New Bag 11/1/18 0258)   Declined pain medication.    Assessments & Plan (with Medical Decision Making)     I have reviewed the nursing notes.    I have reviewed the findings, diagnosis, plan and need for follow up with the patient.    Final diagnoses:   Gastroenteritis     Drink four pints (64  fluid ounces) of plain water daily. Eat dry plain cheerios and applesauce until feeling better. Then progress diet back to normal as tolerated. If not feeling 100% better in 48 hours, follow up with your doctor in 48 to 72 hours. In the meanwhile, if worsening pain or symptoms of illness, immediately return to the emergency department.    11/1/2018   [facility]     Yosvany De Jesus DO  11/01/18 0524

## 2018-11-01 NOTE — DISCHARGE INSTRUCTIONS
Final diagnoses:   Gastroenteritis     Drink four pints (64 fluid ounces) of plain water daily. Eat dry plain cheerios and applesauce until feeling better. Then progress diet back to normal as tolerated. If not feeling 100% better in 48 hours, follow up with your doctor in 48 to 72 hours. In the meanwhile, if worsening pain or symptoms of illness, immediately return to the emergency department.

## 2019-10-12 ENCOUNTER — HOSPITAL ENCOUNTER (EMERGENCY)
Facility: HOSPITAL | Age: 39
Discharge: HOME | End: 2019-10-12
Attending: NURSE PRACTITIONER | Admitting: NURSE PRACTITIONER

## 2019-10-12 VITALS
TEMPERATURE: 98.4 F | SYSTOLIC BLOOD PRESSURE: 150 MMHG | OXYGEN SATURATION: 97 % | RESPIRATION RATE: 16 BRPM | HEART RATE: 93 BPM | DIASTOLIC BLOOD PRESSURE: 93 MMHG

## 2019-10-12 DIAGNOSIS — K04.7 DENTAL ABSCESS: Primary | ICD-10-CM

## 2019-10-12 PROCEDURE — G0463 HOSPITAL OUTPT CLINIC VISIT: HCPCS

## 2019-10-12 PROCEDURE — 99213 OFFICE O/P EST LOW 20 MIN: CPT | Mod: Z6 | Performed by: NURSE PRACTITIONER

## 2019-10-12 RX ORDER — CHLORHEXIDINE GLUCONATE ORAL RINSE 1.2 MG/ML
15 SOLUTION DENTAL 2 TIMES DAILY
Qty: 473 ML | Refills: 0 | Status: SHIPPED | OUTPATIENT
Start: 2019-10-12 | End: 2021-02-18

## 2019-10-12 ASSESSMENT — ENCOUNTER SYMPTOMS
APPETITE CHANGE: 0
FACIAL SWELLING: 0
NAUSEA: 1
DIARRHEA: 0
CHILLS: 0
VOMITING: 0
SHORTNESS OF BREATH: 0
FEVER: 0
TROUBLE SWALLOWING: 0

## 2019-10-12 NOTE — ED TRIAGE NOTES
Pt is here today with c/o left upper molar pain, noticed it 1 week ago, throbbing started 3-4days ago. Is having drainage that is brown and stringy, ibu at 6 am 400mg. Pt thinks its an abscess and just wants an abx.

## 2019-10-12 NOTE — ED AVS SNAPSHOT
[facility]  [address] [place] [address]  Phone:  [phone]                                    Frankie Troncoso   MRN: [id number]    Department:  [facility]   Date of Visit:  10/12/2019           After Visit Summary Signature Page    I have received my discharge instructions, and my questions have been answered. I have discussed any challenges I see with this plan with the nurse or doctor.    ..........................................................................................................................................  Patient/Patient Representative Signature      ..........................................................................................................................................  Patient Representative Print Name and Relationship to Patient    ..................................................               ................................................  Date                                   Time    ..........................................................................................................................................  Reviewed by Signature/Title    ...................................................              ..............................................  Date                                               Time          22EPIC Rev 08/18

## 2019-10-12 NOTE — ED PROVIDER NOTES
History     Chief Complaint   Patient presents with     Dental Pain     left upper. feels as thought he has an abscess. has not seen dentist      HPI  Frankie Troncoso is a 39 year old male who presents to  for dental pain. He reports left upper pain x 4 days. He lost a filling to 1st  molar about 1 week ago. He reports brownish drainage. Denies fever/chills, v/d, SOB, CP. He had dry heaves this morning. Able to swallow own secretions without difficulty. No antibiotics in last 30 days. He has not seen a dentist yet.    Allergies:  Allergies   Allergen Reactions     Banana Swelling     Swelling of oral mucosa     No Clinical Screening - See Comments Swelling     Pea pods- swelling of oral mucosa     Codeine Nausea and Vomiting     Nicotine      Allergic to the patch       Problem List:    Patient Active Problem List    Diagnosis Date Noted     Diabetes mellitus type 1 (H) 07/16/2014     Priority: Medium     Allergic rhinitis due to pollen 01/21/2014     Priority: Medium     Seasonal allergic rhinitis 12/03/2013     Priority: Medium     Sleep-disordered breathing 12/03/2013     Priority: Medium        Past Medical History:    History reviewed. No pertinent past medical history.    Past Surgical History:    History reviewed. No pertinent surgical history.    Family History:    Family History   Problem Relation Age of Onset     Alcohol/Drug Father         alcoholism     C.A.D. Father      Cerebrovascular Disease Father         CVA     Allergies Father      Asthma Father      Heart Disease Other         heart disease - family h/o       Social History:  Marital Status:   [2]  Social History     Tobacco Use     Smoking status: Former Smoker     Packs/day: 2.00     Years: 10.00     Pack years: 20.00     Types: Cigarettes     Smokeless tobacco: Never Used   Substance Use Topics     Alcohol use: Yes     Drug use: No        Medications:    amoxicillin-clavulanate (AUGMENTIN) 875-125 MG tablet  chlorhexidine  (PERIDEX) 0.12 % solution  CINNAMON PO  fish oil-omega-3 fatty acids (FISH OIL) 1000 MG capsule  Insulin Aspart (NOVOLOG SC)  insulin glargine (LANTUS) 100 UNIT/ML injection  LISINOPRIL PO  simvastatin (ZOCOR) 40 MG tablet  VITAMIN E  albuterol (PROAIR HFA, PROVENTIL HFA, VENTOLIN HFA) 108 (90 BASE) MCG/ACT inhaler  blood glucose (ACCU-CHEK CHERELLE) test strip  ondansetron (ZOFRAN ODT) 4 MG ODT tab          Review of Systems   Constitutional: Negative for appetite change, chills and fever.   HENT: Positive for dental problem. Negative for facial swelling and trouble swallowing.    Respiratory: Negative for shortness of breath.    Cardiovascular: Negative for chest pain.   Gastrointestinal: Positive for nausea. Negative for diarrhea and vomiting.   All other systems reviewed and are negative.      Physical Exam   BP: 150/93  Pulse: 93  Temp: 98.4  F (36.9  C)  Resp: 16  SpO2: 97 %      Physical Exam  Vitals signs and nursing note reviewed.   Constitutional:       Appearance: Normal appearance. He is not ill-appearing, toxic-appearing or diaphoretic.   HENT:      Head:      Jaw: No trismus.      Mouth/Throat:      Mouth: Mucous membranes are moist.      Dentition: Dental tenderness and gingival swelling present.      Pharynx: Oropharynx is clear.      Comments: TTP to tooth #15 and 14. Swelling to gumline along teeth # 13-15. No fluctuance. No drainage.   Cardiovascular:      Rate and Rhythm: Normal rate.   Pulmonary:      Effort: Pulmonary effort is normal.   Neurological:      Mental Status: He is alert.         ED Course        Procedures           No results found for this or any previous visit (from the past 24 hour(s)).    Medications - No data to display    Assessments & Plan (with Medical Decision Making)     I have reviewed the nursing notes.    I have reviewed the findings, diagnosis, plan and need for follow up with the patient.  Dental abscess:  Patient has tenderness to palpation to tooth #14 and 15.  Swelling along gumline to teeth #13-15. No trismus. No fluctuance or obvious drainage.   Augmentin and peridex prescribed. Patient declines any pain medication in urgent care as he is more worried about infection.   Discussed with patient to schedule a dentist appointment next week. Continue taking ibuprofen and can alternate with tylenol as needed for pain.   Return to emergency department for worsening or concerning symptoms. Patient verbalized understanding.     Discharge Medication List as of 10/12/2019 11:24 AM      START taking these medications    Details   amoxicillin-clavulanate (AUGMENTIN) 875-125 MG tablet Take 1 tablet by mouth 2 times daily for 10 days, Disp-20 tablet, R-0, E-Prescribe      chlorhexidine (PERIDEX) 0.12 % solution Swish and spit 15 mLs in mouth 2 times daily, Disp-473 mL, R-0, E-Prescribe             Final diagnoses:   Dental abscess       10/12/2019   [facility]     Mpofu, Prudence, CNP  10/12/19 1308

## 2019-10-12 NOTE — DISCHARGE INSTRUCTIONS
Take antibiotic as prescribed and use peridex. Continue taking ibuprofen, can alternate with tylenol.     Schedule an appointment with a dentist next week.     Return to emergency department for worsening or concerning symptoms.

## 2019-10-20 ENCOUNTER — HOSPITAL ENCOUNTER (EMERGENCY)
Facility: HOSPITAL | Age: 39
End: 2019-10-20

## 2020-06-17 ENCOUNTER — HOSPITAL ENCOUNTER (EMERGENCY)
Facility: OTHER | Age: 40
Discharge: HOME | End: 2020-06-17
Attending: FAMILY MEDICINE | Admitting: FAMILY MEDICINE

## 2020-06-17 DIAGNOSIS — Z57.9 OCCUPATIONAL EXPOSURE IN WORKPLACE: ICD-10-CM

## 2020-06-17 PROCEDURE — 86803 HEPATITIS C AB TEST: CPT | Performed by: FAMILY MEDICINE

## 2020-06-17 PROCEDURE — 86706 HEP B SURFACE ANTIBODY: CPT | Performed by: FAMILY MEDICINE

## 2020-06-17 PROCEDURE — U0003 INFECTIOUS AGENT DETECTION BY NUCLEIC ACID (DNA OR RNA); SEVERE ACUTE RESPIRATORY SYNDROME CORONAVIRUS 2 (SARS-COV-2) (CORONAVIRUS DISEASE [COVID-19]), AMPLIFIED PROBE TECHNIQUE, MAKING USE OF HIGH THROUGHPUT TECHNOLOGIES AS DESCRIBED BY CMS-2020-01-R: HCPCS | Performed by: FAMILY MEDICINE

## 2020-06-17 PROCEDURE — 99283 EMERGENCY DEPT VISIT LOW MDM: CPT | Performed by: FAMILY MEDICINE

## 2020-06-17 PROCEDURE — 87389 HIV-1 AG W/HIV-1&-2 AB AG IA: CPT | Performed by: FAMILY MEDICINE

## 2020-06-17 PROCEDURE — 99282 EMERGENCY DEPT VISIT SF MDM: CPT | Mod: Z6 | Performed by: FAMILY MEDICINE

## 2020-06-17 PROCEDURE — 36415 COLL VENOUS BLD VENIPUNCTURE: CPT | Performed by: FAMILY MEDICINE

## 2020-06-17 SDOH — HEALTH STABILITY - PHYSICAL HEALTH: OCCUPATIONAL EXPOSURE TO UNSPECIFIED RISK FACTOR: Z57.9

## 2020-06-19 ENCOUNTER — TELEPHONE (OUTPATIENT)
Dept: EMERGENCY MEDICINE | Facility: OTHER | Age: 40
End: 2020-06-19

## 2020-06-19 LAB
HBV SURFACE AB SERPL IA-ACNC: 38.26 M[IU]/ML
HCV AB SERPL QL IA: NONREACTIVE
HIV 1+2 AB+HIV1 P24 AG SERPL QL IA: NONREACTIVE
SARS-COV-2 RNA SPEC QL NAA+PROBE: NOT DETECTED
SPECIMEN SOURCE: NORMAL

## 2020-06-19 NOTE — TELEPHONE ENCOUNTER
[facility] Emergency Department Lab result notification:    Reason for call  Blood and body fluid exposure    Lab Result  The following blood and bodily fluid lab results from a recent exposure were all negative (nonreactive) for:     Hepatitis C antibody     Hepatitis B surface antigen (agn)     HIV Antigen Antibody Combo    The following blood and body fluid lab result from a recent exposure were Positive (reactive) for:    Hepatitis B surface antibody (rayshawn)     Patient to be notified of result and advised per [facility] ED lab result protocol  [Note: If Hepatitis B surface ANTIBODY (rayshawn) is reactive/positive, this indicates Patient has immunity]    ED visit Date: 6/17/20  Symptoms reported at ED visit Not documented.   Miscellaneous information      Current symptoms  No symptoms.      Recommendations/Instructions  Reviewed general recommendation for f/u re-testing in 6 weeks as per protocol.    Contact your PCP clinic or return to the Emergency department if your:    Concerning symptom's.    Vicky Coats RN    [facility]   Lung Nodule and ED Lab Results F/U RN  Epic pool (ED late result f/u RN) : [id number]   # [phone]

## 2020-06-20 ASSESSMENT — ENCOUNTER SYMPTOMS
EYE PAIN: 0
FEVER: 0
EYE ITCHING: 0
SHORTNESS OF BREATH: 0
CHILLS: 0
EYE REDNESS: 0
PHOTOPHOBIA: 0
EYE DISCHARGE: 0

## 2020-06-20 NOTE — ED PROVIDER NOTES
History   No chief complaint on file.    HPI  Frankie Troncoso is a 40 year old male who presents briefly to the emergency department after a patient spit in his eye.  Source patient was not obviously ill, it was just a call for alcohol intoxication.  Yung is feeling well currently as well.    Allergies:  Allergies   Allergen Reactions     Banana Swelling     Swelling of oral mucosa     No Clinical Screening - See Comments Swelling     Pea pods- swelling of oral mucosa     Codeine Nausea and Vomiting     Nicotine      Allergic to the patch       Problem List:    Patient Active Problem List    Diagnosis Date Noted     Diabetes mellitus type 1 (H) 07/16/2014     Priority: Medium     Allergic rhinitis due to pollen 01/21/2014     Priority: Medium     Seasonal allergic rhinitis 12/03/2013     Priority: Medium     Sleep-disordered breathing 12/03/2013     Priority: Medium        Past Medical History:    No past medical history on file.    Past Surgical History:    No past surgical history on file.    Family History:    Family History   Problem Relation Age of Onset     Alcohol/Drug Father         alcoholism     C.A.D. Father      Cerebrovascular Disease Father         CVA     Allergies Father      Asthma Father      Heart Disease Other         heart disease - family h/o       Social History:  Marital Status:   [2]  Social History     Tobacco Use     Smoking status: Former Smoker     Packs/day: 2.00     Years: 10.00     Pack years: 20.00     Types: Cigarettes     Smokeless tobacco: Never Used   Substance Use Topics     Alcohol use: Yes     Drug use: No        Medications:    albuterol (PROAIR HFA, PROVENTIL HFA, VENTOLIN HFA) 108 (90 BASE) MCG/ACT inhaler  blood glucose (ACCU-CHEK CHERELLE) test strip  chlorhexidine (PERIDEX) 0.12 % solution  CINNAMON PO  fish oil-omega-3 fatty acids (FISH OIL) 1000 MG capsule  Insulin Aspart (NOVOLOG SC)  insulin glargine (LANTUS) 100 UNIT/ML injection  LISINOPRIL PO  ondansetron  (ZOFRAN ODT) 4 MG ODT tab  simvastatin (ZOCOR) 40 MG tablet  VITAMIN E          Review of Systems   Constitutional: Negative for chills and fever.   Eyes: Negative for photophobia, pain, discharge, redness, itching and visual disturbance.   Respiratory: Negative for shortness of breath.        Physical Exam          Physical Exam  Vitals signs and nursing note reviewed.   Constitutional:       General: He is not in acute distress.     Appearance: He is not diaphoretic.   HENT:      Head: Atraumatic.   Eyes:      General: No scleral icterus.     Pupils: Pupils are equal, round, and reactive to light.   Cardiovascular:      Heart sounds: Normal heart sounds.   Pulmonary:      Effort: No respiratory distress.      Breath sounds: Normal breath sounds.   Abdominal:      General: Bowel sounds are normal.      Palpations: Abdomen is soft.      Tenderness: There is no abdominal tenderness.   Musculoskeletal:         General: No tenderness.   Skin:     General: Skin is warm.      Findings: No rash.         ED Course        Procedures       No results found for this or any previous visit (from the past 24 hour(s)).    Medications - No data to display    Assessments & Plan (with Medical Decision Making)     I have reviewed the nursing notes.    I have reviewed the findings, diagnosis, plan and need for follow up with the patient.    Discharge Medication List as of 6/17/2020 10:47 PM          Final diagnoses:   Occupational exposure in workplace     Patient tested for hepatitis B, HIV routine.  Reviewed [facility] see in his hepatitis b vaccine is up-to-date.  Source patient is also being tested.  Recommend repeat HIV testing in 3 months.  Seek medical care with any symptoms.  Patient verbalized understanding plan is in agreement.  6/17/2020   [facility]     Cuauhtemoc Hamm MD  06/20/20 0953

## 2021-02-18 ENCOUNTER — TELEPHONE (OUTPATIENT)
Dept: ALLERGY | Facility: OTHER | Age: 41
End: 2021-02-18

## 2021-02-18 ENCOUNTER — OFFICE VISIT (OUTPATIENT)
Dept: OTOLARYNGOLOGY | Facility: OTHER | Age: 41
End: 2021-02-18
Attending: PHYSICIAN ASSISTANT
Payer: COMMERCIAL

## 2021-02-18 VITALS
SYSTOLIC BLOOD PRESSURE: 120 MMHG | OXYGEN SATURATION: 98 % | TEMPERATURE: 97.6 F | DIASTOLIC BLOOD PRESSURE: 72 MMHG | HEIGHT: 69 IN | WEIGHT: 226 LBS | HEART RATE: 84 BPM | BODY MASS INDEX: 33.47 KG/M2

## 2021-02-18 DIAGNOSIS — L23.1 ALLERGIC CONTACT DERMATITIS DUE TO ADHESIVES: ICD-10-CM

## 2021-02-18 DIAGNOSIS — J30.1 SEASONAL ALLERGIC RHINITIS DUE TO POLLEN: Primary | ICD-10-CM

## 2021-02-18 DIAGNOSIS — J30.2 SEASONAL ALLERGIC RHINITIS, UNSPECIFIED TRIGGER: ICD-10-CM

## 2021-02-18 DIAGNOSIS — J30.89 PERENNIAL ALLERGIC RHINITIS: ICD-10-CM

## 2021-02-18 PROCEDURE — 99203 OFFICE O/P NEW LOW 30 MIN: CPT | Performed by: PHYSICIAN ASSISTANT

## 2021-02-18 RX ORDER — ATORVASTATIN CALCIUM 80 MG/1
40 TABLET, FILM COATED ORAL DAILY
COMMUNITY
End: 2023-01-09

## 2021-02-18 RX ORDER — INSULIN ASPART 100 [IU]/ML
8-14 INJECTION, SOLUTION INTRAVENOUS; SUBCUTANEOUS
COMMUNITY
End: 2023-01-09

## 2021-02-18 RX ORDER — FEXOFENADINE HCL 180 MG/1
180 TABLET ORAL DAILY
Qty: 30 TABLET | Refills: 11 | Status: SHIPPED | OUTPATIENT
Start: 2021-02-18 | End: 2023-01-09

## 2021-02-18 RX ORDER — TRIAMCINOLONE ACETONIDE 1 MG/G
CREAM TOPICAL 2 TIMES DAILY
Qty: 30 G | Refills: 1 | Status: SHIPPED | OUTPATIENT
Start: 2021-02-18 | End: 2023-01-09

## 2021-02-18 ASSESSMENT — MIFFLIN-ST. JEOR: SCORE: 1920.51

## 2021-02-18 ASSESSMENT — PAIN SCALES - GENERAL: PAINLEVEL: NO PAIN (0)

## 2021-02-18 NOTE — PATIENT INSTRUCTIONS
Return for allergy skin testing.   Continue with Flonase.   Try Cuco Med rinse. Rinse 1-2 times daily as needed for congestion.   Change Zyrtec to Allegra.     Start Allegra 180 mg daily.       Thank you for allowing Brenda Castro PA-C and our ENT team to participate in your care.  If your medications are too expensive, please give the nurse a call.  We can possibly change this medication.  If you have a scheduling or an appointment question please contact our Health Unit Coordinator at their direct line [phone].   ALL nursing questions or concerns can be directed to your ENT nurse at: [phone] [place]

## 2021-02-18 NOTE — LETTER
2021         RE: Frankie Troncoso  [address] [place] [id number]        Dear Colleague,    Thank you for referring your patient, Frankie Troncoso, to the [facility]. Please see a copy of my visit note below.    Otolaryngology Consultation    Patient: Frankie Troncoso  : 1980    Chief Complaint   Patient presents with     Allergies     Pt is here for allergies. Fall and Summer watery eyes, sneezing, and asthma attacks.  Indoor allergies in the Winter.  Adhesive allergy.       HPI:  Frankie Troncoso is a 40 year old male seen today for allergy evaluation.    Bill was seen in ENT in  by Dr. Clayton. At that time he completed MQT and was on SLIT. He did discontinue SLIT as he felt it triggered an asthma attack.   He was decreased to a lower SLIT dose, Rinses, Astelin and Allegra.   He was lost to follow up and I do not see further refills noted.   He has felt since leavng the mine, he flet improvement.   He has felt grass, straw or hay worsens or exacerbates his symptoms.   Currently on Flonase, Zyrtec daily.   He feels some relief with his allergies.   Reports no recent asthma flares and no wheeze/ dyspnea.   Singulair caused hypoglycemia.     Resides in a house with a basement  There is no water or mold  Carpet in bedroom No  Heat in home Forced air/ wall unit  Animals 2 cats- 1 dog   Family hx of allergies Father   Past trials of medications- Claritin, Zyrtec.   Prior allergy testing- yes-   MQT is scanned.     +patient has Dexcom meter and recurrent contact dermatitis. Reports it has been ongoing and worsening w/ use.     Current Outpatient Rx   Medication Sig Dispense Refill     albuterol (PROAIR HFA, PROVENTIL HFA, VENTOLIN HFA) 108 (90 BASE) MCG/ACT inhaler Inhale 2 puffs into the lungs every 6 hours as needed for shortness of breath / dyspnea 1 Inhaler prn     blood glucose (ACCU-CHEK CHERELLE) test strip USE STRIP TO CHECK GLUCOSE TWICE DAILY 1 Box 5      "chlorhexidine (PERIDEX) 0.12 % solution Swish and spit 15 mLs in mouth 2 times daily 473 mL 0     CINNAMON PO        fish oil-omega-3 fatty acids (FISH OIL) 1000 MG capsule Take 1 capsule by mouth 2 times daily       Insulin Aspart (NOVOLOG SC)        insulin glargine (LANTUS) 100 UNIT/ML injection Inject Subcutaneous At Bedtime       LISINOPRIL PO Take 2.5 mg by mouth daily       ondansetron (ZOFRAN ODT) 4 MG ODT tab Take 1 tablet (4 mg) by mouth every 8 hours as needed for nausea or vomiting 20 tablet 0     simvastatin (ZOCOR) 40 MG tablet Take 1 tablet by mouth every evening       VITAMIN E          Allergies: Banana, No clinical screening - see comments, Codeine, and Nicotine     No past medical history on file.    No past surgical history on file.    ENT family history reviewed    Social History     Tobacco Use     Smoking status: Former Smoker     Packs/day: 2.00     Years: 10.00     Pack years: 20.00     Types: Cigarettes     Smokeless tobacco: Never Used   Substance Use Topics     Alcohol use: Yes     Drug use: No       Review of Systems  ROS: 10 point ROS neg other than the symptoms noted above in the HPI     Physical Exam    /72 (BP Location: Right arm, Cuff Size: Adult Large)   Pulse 84   Temp 97.6  F (36.4  C) (Tympanic)   Ht 1.753 m (5' 9\")   Wt 102.5 kg (226 lb)   SpO2 98%   BMI 33.37 kg/m      General - The patient is well nourished and well developed, and appears to have good nutritional status.  Alert and oriented to person and place, answers questions and cooperates with examination appropriately.   Head and Face - Normocephalic and atraumatic, with no gross asymmetry noted.  The facial nerve is intact, with strong symmetric movements.  Voice and Breathing - The patient was breathing comfortably without the use of accessory muscles. There was no wheezing, stridor, or stertor.  The patients voice was clear and strong, and had appropriate pitch and quality.  Ears -The external auditory " canals are patent, the tympanic membranes are intact without effusion, retraction or mass.  Bony landmarks are intact.  Eyes - Extraocular movements intact, and the pupils were reactive to light.  Sclera were not icteric or injected, conjunctiva were pink and moist.  Mouth - Examination of the oral cavity showed pink, healthy oral mucosa. No lesions or ulcerations noted.  The tongue was mobile and midline, and the dentition were in good condition.    Throat - The walls of the oropharynx were smooth, pink, moist, symmetric, and had no lesions or ulcerations.  The tonsillar pillars and soft palate were symmetric.  The uvula was midline on elevation.    Neck - Normal midline excursion of the laryngotracheal complex during swallowing.  Full range of motion on passive movement.  Palpation of the occipital, submental, submandibular, internal jugular chain, and supraclavicular nodes did not demonstrate any abnormal lymph nodes or masses.  Palpation of the thyroid was soft and smooth, with no nodules or goiter appreciated.  The trachea was mobile and midline.  Nose - External contour is symmetric, no gross deflection or scars.  Nasal mucosa is pink and moist with no abnormal mucus.  The septum was intact and the turbinates are enlarged.  No polyps, masses, or purulence noted on examination.  Heart- Regular rate  Lungs- Clear A/p.   Skin - 3 sites of prior dexcom application with oval shape, c/w dermatitis.     ASSESSMENT:    ICD-10-CM    1. Seasonal allergic rhinitis due to pollen  J30.1 fexofenadine (ALLEGRA) 180 MG tablet   2. Perennial allergic rhinitis  J30.89 fexofenadine (ALLEGRA) 180 MG tablet   3. Seasonal allergic rhinitis, unspecified trigger  J30.2 fexofenadine (ALLEGRA) 180 MG tablet   4. Allergic contact dermatitis due to adhesives  L23.1 triamcinolone (KENALOG) 0.1 % external cream           Return for allergy skin testing.   Continue with Flonase.   Try Cuco Med rinse. Rinse 1-2 times daily as needed for  congestion.   Change Zyrtec to Allegra.   Start Allegra 180 mg daily.       Use nasal saline as discussed today. Over the counter Abelino med saline irrigation is recommended.  Try to use hypertonic saline which is 2 packages in 1 abelino med bottle per instructions on bottle.  Use this at least 2 times a day, blow your nose gently, then apply any other nasal medication that may have been prescribed today (nasal steroids or nasal antihistamines).  Take your antihistamine daily (cetirizine, loratadine, fexofenadine, or similar) or twice daily if recommended.    Allergen avoidance measures were discussed and are important in preventing symptoms from occurring or worsening.    Indications for allergy testing include:   1) Confirm suspicion of allergic rhinitis due to inhalant allergies  2) Identify the offending allergen to determine specific mode of treatment  3) In the case of chronic rhinosinusitis: when symptoms are not controlled by avoidance and pharmacotherapy  4) In the Asthma patient when exacerbations may be due to perennial allergen exposure  5) Suspect food allergy  6) Otitis Media, chronic rhinitis, atopic dermatitis, Meniere disease, headache, pharyngitis or eye symptoms    Due to the findings above,  modified quantitative testing (MQT) will be performed.  Signed consent was obtained, and the risks of immunotherapy were discussed, including the potential for anaphylaxis.    If immunotherapy (IT) is recommended, there is continued risk of anaphylaxis.   Anaphylaxis can cause death. The patient will need to be monitored for 30 minutes post injection.  They must present their epinephrine pen prior to injection.  Subcutaneous as well as sublingual immunotherapy (SLIT) were discussed as potential treatment options.  The patient was told SLIT is not approved by the FDA and is cash pay.  The general time frame of immunotherapy was discussed (generally 3-5 years, sometimes longer), and the basic immunology behind IT  was discussed.    Sent in Kenalog cream for contact dermatitis.   I spoke to pharmacist regarding adhesive reaction, and no other option available here.   He will contact his endocrinologist.           Brenda Castro PA-C  [facility], [place]  [phone]        Again, thank you for allowing me to participate in the care of your patient.        Sincerely,        Brenda Castro PA-C

## 2021-02-18 NOTE — TELEPHONE ENCOUNTER
Went over instructions with patient for allergy skin testing.  Reviewed patients current medications and patient will avoid all contraindicated medications prior to MQT testing.  Patient verbalizes understanding.  Copy of allergy testing packet was already given to patient at his ENT visit.  Patient is scheduled for his allergy test and follow-up.  He is encouraged to call with any questions before testing.    Velia Rodrigez RN

## 2021-02-18 NOTE — NURSING NOTE
"Chief Complaint   Patient presents with     Allergies     Pt is here for allergies. Fall and Summer watery eyes, sneezing, and asthma attacks.  Indoor allergies in the Winter.  Adhesive allergy.       Initial /72 (BP Location: Right arm, Cuff Size: Adult Large)   Pulse 84   Temp 97.6  F (36.4  C) (Tympanic)   Ht 1.753 m (5' 9\")   Wt 102.5 kg (226 lb)   SpO2 98%   BMI 33.37 kg/m   Estimated body mass index is 33.37 kg/m  as calculated from the following:    Height as of this encounter: 1.753 m (5' 9\").    Weight as of this encounter: 102.5 kg (226 lb).  Medication Reconciliation: complete  Rizwana Menendez LPN    "

## 2021-02-18 NOTE — PROGRESS NOTES
Otolaryngology Consultation    Patient: Frankie Troncoso  : 1980    Chief Complaint   Patient presents with     Allergies     Pt is here for allergies. Fall and Summer watery eyes, sneezing, and asthma attacks.  Indoor allergies in the Winter.  Adhesive allergy.       HPI:  Frankie Troncoso is a 40 year old male seen today for allergy evaluation.    Yung was seen in ENT in  by Dr. Clayton. At that time he completed MQT and was on SLIT. He did discontinue SLIT as he felt it triggered an asthma attack.   He was decreased to a lower SLIT dose, Rinses, Astelin and Allegra.   He was lost to follow up and I do not see further refills noted.   He has felt since leavng the mine, he flet improvement.   He has felt grass, straw or hay worsens or exacerbates his symptoms.   Currently on Flonase, Zyrtec daily.   He feels some relief with his allergies.   Reports no recent asthma flares and no wheeze/ dyspnea.   Singulair caused hypoglycemia.     Resides in a house with a basement  There is no water or mold  Carpet in bedroom No  Heat in home Forced air/ wall unit  Animals 2 cats- 1 dog   Family hx of allergies Father   Past trials of medications- Claritin, Zyrtec.   Prior allergy testing- yes-   MQT is scanned.     +patient has Dexcom meter and recurrent contact dermatitis. Reports it has been ongoing and worsening w/ use.     Current Outpatient Rx   Medication Sig Dispense Refill     albuterol (PROAIR HFA, PROVENTIL HFA, VENTOLIN HFA) 108 (90 BASE) MCG/ACT inhaler Inhale 2 puffs into the lungs every 6 hours as needed for shortness of breath / dyspnea 1 Inhaler prn     blood glucose (ACCU-CHEK CHERELLE) test strip USE STRIP TO CHECK GLUCOSE TWICE DAILY 1 Box 5     chlorhexidine (PERIDEX) 0.12 % solution Swish and spit 15 mLs in mouth 2 times daily 473 mL 0     CINNAMON PO        fish oil-omega-3 fatty acids (FISH OIL) 1000 MG capsule Take 1 capsule by mouth 2 times daily       Insulin Aspart (NOVOLOG SC)     "    insulin glargine (LANTUS) 100 UNIT/ML injection Inject Subcutaneous At Bedtime       LISINOPRIL PO Take 2.5 mg by mouth daily       ondansetron (ZOFRAN ODT) 4 MG ODT tab Take 1 tablet (4 mg) by mouth every 8 hours as needed for nausea or vomiting 20 tablet 0     simvastatin (ZOCOR) 40 MG tablet Take 1 tablet by mouth every evening       VITAMIN E          Allergies: Banana, No clinical screening - see comments, Codeine, and Nicotine     No past medical history on file.    No past surgical history on file.    ENT family history reviewed    Social History     Tobacco Use     Smoking status: Former Smoker     Packs/day: 2.00     Years: 10.00     Pack years: 20.00     Types: Cigarettes     Smokeless tobacco: Never Used   Substance Use Topics     Alcohol use: Yes     Drug use: No       Review of Systems  ROS: 10 point ROS neg other than the symptoms noted above in the HPI     Physical Exam    /72 (BP Location: Right arm, Cuff Size: Adult Large)   Pulse 84   Temp 97.6  F (36.4  C) (Tympanic)   Ht 1.753 m (5' 9\")   Wt 102.5 kg (226 lb)   SpO2 98%   BMI 33.37 kg/m      General - The patient is well nourished and well developed, and appears to have good nutritional status.  Alert and oriented to person and place, answers questions and cooperates with examination appropriately.   Head and Face - Normocephalic and atraumatic, with no gross asymmetry noted.  The facial nerve is intact, with strong symmetric movements.  Voice and Breathing - The patient was breathing comfortably without the use of accessory muscles. There was no wheezing, stridor, or stertor.  The patients voice was clear and strong, and had appropriate pitch and quality.  Ears -The external auditory canals are patent, the tympanic membranes are intact without effusion, retraction or mass.  Bony landmarks are intact.  Eyes - Extraocular movements intact, and the pupils were reactive to light.  Sclera were not icteric or injected, conjunctiva were " pink and moist.  Mouth - Examination of the oral cavity showed pink, healthy oral mucosa. No lesions or ulcerations noted.  The tongue was mobile and midline, and the dentition were in good condition.    Throat - The walls of the oropharynx were smooth, pink, moist, symmetric, and had no lesions or ulcerations.  The tonsillar pillars and soft palate were symmetric.  The uvula was midline on elevation.    Neck - Normal midline excursion of the laryngotracheal complex during swallowing.  Full range of motion on passive movement.  Palpation of the occipital, submental, submandibular, internal jugular chain, and supraclavicular nodes did not demonstrate any abnormal lymph nodes or masses.  Palpation of the thyroid was soft and smooth, with no nodules or goiter appreciated.  The trachea was mobile and midline.  Nose - External contour is symmetric, no gross deflection or scars.  Nasal mucosa is pink and moist with no abnormal mucus.  The septum was intact and the turbinates are enlarged.  No polyps, masses, or purulence noted on examination.  Heart- Regular rate  Lungs- Clear A/p.   Skin - 3 sites of prior dexcom application with oval shape, c/w dermatitis.     ASSESSMENT:    ICD-10-CM    1. Seasonal allergic rhinitis due to pollen  J30.1 fexofenadine (ALLEGRA) 180 MG tablet   2. Perennial allergic rhinitis  J30.89 fexofenadine (ALLEGRA) 180 MG tablet   3. Seasonal allergic rhinitis, unspecified trigger  J30.2 fexofenadine (ALLEGRA) 180 MG tablet   4. Allergic contact dermatitis due to adhesives  L23.1 triamcinolone (KENALOG) 0.1 % external cream           Return for allergy skin testing.   Continue with Flonase.   Try Abelino Med rinse. Rinse 1-2 times daily as needed for congestion.   Change Zyrtec to Allegra.   Start Allegra 180 mg daily.       Use nasal saline as discussed today. Over the counter Abelino med saline irrigation is recommended.  Try to use hypertonic saline which is 2 packages in 1 abelino med bottle per  instructions on bottle.  Use this at least 2 times a day, blow your nose gently, then apply any other nasal medication that may have been prescribed today (nasal steroids or nasal antihistamines).  Take your antihistamine daily (cetirizine, loratadine, fexofenadine, or similar) or twice daily if recommended.    Allergen avoidance measures were discussed and are important in preventing symptoms from occurring or worsening.    Indications for allergy testing include:   1) Confirm suspicion of allergic rhinitis due to inhalant allergies  2) Identify the offending allergen to determine specific mode of treatment  3) In the case of chronic rhinosinusitis: when symptoms are not controlled by avoidance and pharmacotherapy  4) In the Asthma patient when exacerbations may be due to perennial allergen exposure  5) Suspect food allergy  6) Otitis Media, chronic rhinitis, atopic dermatitis, Meniere disease, headache, pharyngitis or eye symptoms    Due to the findings above,  modified quantitative testing (MQT) will be performed.  Signed consent was obtained, and the risks of immunotherapy were discussed, including the potential for anaphylaxis.    If immunotherapy (IT) is recommended, there is continued risk of anaphylaxis.   Anaphylaxis can cause death. The patient will need to be monitored for 30 minutes post injection.  They must present their epinephrine pen prior to injection.  Subcutaneous as well as sublingual immunotherapy (SLIT) were discussed as potential treatment options.  The patient was told SLIT is not approved by the FDA and is cash pay.  The general time frame of immunotherapy was discussed (generally 3-5 years, sometimes longer), and the basic immunology behind IT was discussed.    Sent in Kenalog cream for contact dermatitis.   I spoke to pharmacist regarding adhesive reaction, and no other option available here.   He will contact his endocrinologist.           Brenda Castro PA-C  ENT  [facility],  [place]  [phone]

## 2021-04-01 ENCOUNTER — OFFICE VISIT (OUTPATIENT)
Dept: ALLERGY | Facility: OTHER | Age: 41
End: 2021-04-01
Attending: PHYSICIAN ASSISTANT
Payer: COMMERCIAL

## 2021-04-01 ENCOUNTER — OFFICE VISIT (OUTPATIENT)
Dept: OTOLARYNGOLOGY | Facility: OTHER | Age: 41
End: 2021-04-01
Attending: PHYSICIAN ASSISTANT
Payer: COMMERCIAL

## 2021-04-01 VITALS
HEIGHT: 69 IN | WEIGHT: 228 LBS | HEART RATE: 72 BPM | OXYGEN SATURATION: 98 % | SYSTOLIC BLOOD PRESSURE: 116 MMHG | BODY MASS INDEX: 33.77 KG/M2 | DIASTOLIC BLOOD PRESSURE: 80 MMHG | TEMPERATURE: 97.5 F

## 2021-04-01 DIAGNOSIS — J30.1 SEASONAL ALLERGIC RHINITIS DUE TO POLLEN: Primary | ICD-10-CM

## 2021-04-01 DIAGNOSIS — J30.2 SEASONAL ALLERGIC RHINITIS, UNSPECIFIED TRIGGER: Primary | ICD-10-CM

## 2021-04-01 DIAGNOSIS — J30.89 PERENNIAL ALLERGIC RHINITIS: ICD-10-CM

## 2021-04-01 DIAGNOSIS — T78.40XD ALLERGIC REACTION, SUBSEQUENT ENCOUNTER: ICD-10-CM

## 2021-04-01 PROCEDURE — 99213 OFFICE O/P EST LOW 20 MIN: CPT | Mod: 25 | Performed by: PHYSICIAN ASSISTANT

## 2021-04-01 PROCEDURE — 95004 PERQ TESTS W/ALRGNC XTRCS: CPT

## 2021-04-01 PROCEDURE — 95024 IQ TESTS W/ALLERGENIC XTRCS: CPT

## 2021-04-01 RX ORDER — EPINEPHRINE 0.3 MG/.3ML
0.3 INJECTION SUBCUTANEOUS PRN
Qty: 0.6 ML | Refills: 1 | Status: SHIPPED | OUTPATIENT
Start: 2021-04-01

## 2021-04-01 ASSESSMENT — ASTHMA QUESTIONNAIRES
ACT_TOTALSCORE: 25
QUESTION_2 LAST FOUR WEEKS HOW OFTEN HAVE YOU HAD SHORTNESS OF BREATH: NOT AT ALL
QUESTION_1 LAST FOUR WEEKS HOW MUCH OF THE TIME DID YOUR ASTHMA KEEP YOU FROM GETTING AS MUCH DONE AT WORK, SCHOOL OR AT HOME: NONE OF THE TIME
QUESTION_3 LAST FOUR WEEKS HOW OFTEN DID YOUR ASTHMA SYMPTOMS (WHEEZING, COUGHING, SHORTNESS OF BREATH, CHEST TIGHTNESS OR PAIN) WAKE YOU UP AT NIGHT OR EARLIER THAN USUAL IN THE MORNING: NOT AT ALL
QUESTION_4 LAST FOUR WEEKS HOW OFTEN HAVE YOU USED YOUR RESCUE INHALER OR NEBULIZER MEDICATION (SUCH AS ALBUTEROL): NOT AT ALL
QUESTION_5 LAST FOUR WEEKS HOW WOULD YOU RATE YOUR ASTHMA CONTROL: COMPLETELY CONTROLLED

## 2021-04-01 ASSESSMENT — MIFFLIN-ST. JEOR: SCORE: 1924.58

## 2021-04-01 ASSESSMENT — PAIN SCALES - GENERAL: PAINLEVEL: NO PAIN (0)

## 2021-04-01 NOTE — LETTER
4/1/2021         RE: Frankie Troncoso  [address] [place] [id number]        Dear Colleague,    Thank you for referring your patient, Frankie Troncoso, to the [facility]. Please see a copy of my visit note below.    Chief Complaint   Patient presents with     Other     MQT testing         Patient returns for MQT    Completed MQT today  MQT-   Dilution #6- Grass, alternatia  Dilution #5- Dog, Dust  Dilution #2- ragweed, pigweed, thistle, birch, maple, elm, oak, etienne, pine, cottonwood, walnut, molds, cat    Yung was seen in ENT in 2014 by Dr. Clayton. At that time he completed MQT and was on SLIT. He did discontinue SLIT as he felt it triggered an asthma attack.   He was decreased to a lower SLIT dose, Rinses, Astelin and Allegra.   He was lost to follow up and I do not see further refills noted.   He has felt since leavng the mine, he flet improvement.   He has felt grass, straw or hay worsens or exacerbates his symptoms.   Currently on Flonase, Zyrtec daily.   He feels some relief with his allergies.   Reports no recent asthma flares and no wheeze/ dyspnea.   Singulair caused hypoglycemia.     Resides in a house with a basement  There is no water or mold  Carpet in bedroom No  Heat in home Forced air/ wall unit  Animals 2 cats- 1 dog   Family hx of allergies Father   Past trials of medications- Claritin, Zyrtec.   Prior allergy testing- yes- 2014  MQT is scanned.      +patient has Dexcom meter and recurrent contact dermatitis. Did contact pharmacist and they are not aware of other options for device. He was instructed to contact Endocrinology.         No past medical history on file.     Allergies   Allergen Reactions     No Clinical Screening - See Comments Swelling     Pea pods- swelling of oral mucosa     Adhesive Tape      Codeine Nausea and Vomiting     Nicotine      Allergic to the patch     Current Outpatient Medications   Medication     albuterol (PROAIR HFA, PROVENTIL HFA,  "VENTOLIN HFA) 108 (90 BASE) MCG/ACT inhaler     atorvastatin (LIPITOR) 20 MG tablet     blood glucose (ACCU-CHEK CHERELLE) test strip     CINNAMON PO     fexofenadine (ALLEGRA) 180 MG tablet     fish oil-omega-3 fatty acids (FISH OIL) 1000 MG capsule     insulin aspart (NOVOLOG FLEXPEN) 100 UNIT/ML pen     insulin glargine (LANTUS) 100 UNIT/ML injection     LISINOPRIL PO     triamcinolone (KENALOG) 0.1 % external cream     VITAMIN E     No current facility-administered medications for this visit.       ROS: 10 point ROS neg other than the symptoms noted above in the HPI.  /80 (BP Location: Left arm, Patient Position: Sitting, Cuff Size: Adult Regular)   Pulse 72   Temp 97.5  F (36.4  C)   Ht 1.753 m (5' 9\")   Wt 103.4 kg (228 lb)   SpO2 98%   BMI 33.67 kg/m      General - The patient is well nourished and well developed, and appears to have good nutritional status.  Alert and oriented to person and place, answers questions and cooperates with examination appropriately.   Head and Face - Normocephalic and atraumatic, with no gross asymmetry noted.  The facial nerve is intact, with strong symmetric movements.  Voice and Breathing - The patient was breathing comfortably without the use of accessory muscles. There was no wheezing, stridor, or stertor.  The patients voice was clear and strong, and had appropriate pitch and quality.  Ears -The external auditory canals are patent, the tympanic membranes are intact without effusion, retraction or mass.  Bony landmarks are intact.  Eyes - Extraocular movements intact, and the pupils were reactive to light.  Sclera were not icteric or injected, conjunctiva were pink and moist.  Mouth - Examination of the oral cavity showed pink, healthy oral mucosa. No lesions or ulcerations noted.  The tongue was mobile and midline, and the dentition were in good condition.    Throat - The walls of the oropharynx were smooth, pink, moist, symmetric, and had no lesions or " ulcerations.  The tonsillar pillars and soft palate were symmetric.  The uvula was midline on elevation.    Neck - Normal midline excursion of the laryngotracheal complex during swallowing.  Full range of motion on passive movement.  Palpation of the occipital, submental, submandibular, internal jugular chain, and supraclavicular nodes did not demonstrate any abnormal lymph nodes or masses.  Palpation of the thyroid was soft and smooth, with no nodules or goiter appreciated.  The trachea was mobile and midline.  Nose - External contour is symmetric, no gross deflection or scars.  Nasal mucosa is pink and moist with no abnormal mucus.  The septum was intact and the turbinates are enlarged.  No polyps, masses, or purulence noted on examination.  Skin - Post test appearance.       ASSESSMENT:      ICD-10-CM    1. Seasonal allergic rhinitis due to pollen  J30.1 EPINEPHrine (ANY BX GENERIC EQUIV) 0.3 MG/0.3ML injection 2-pack     ORDER FOR ALLERGEN IMMUNOTHERAPY   2. Perennial allergic rhinitis  J30.89 EPINEPHrine (ANY BX GENERIC EQUIV) 0.3 MG/0.3ML injection 2-pack     ORDER FOR ALLERGEN IMMUNOTHERAPY   3. Allergic reaction, subsequent encounter  T78.40XD EPINEPHrine (ANY BX GENERIC EQUIV) 0.3 MG/0.3ML injection 2-pack     Start allergy injections.   He was on SCIT in past and felt he was able to tolerate SCIT without concerns. Course of SCIT reviewed again and patient wishes to proceed.     Continue with Zyrtec daily.   [facility]  Follow up in 6 months.         Brenda Castro PA-C  ENT  [facility], [place]  [phone]        Again, thank you for allowing me to participate in the care of your patient.        Sincerely,        Brenda Castro PA-C

## 2021-04-01 NOTE — PATIENT INSTRUCTIONS
Start allergy injections.   Continue with Zyrtec daily.   [facility]  Follow up in 6 months.     Thank you for allowing Brenda Castro PA-C and our ENT team to participate in your care.  If your medications are too expensive, please give the nurse a call.  We can possibly change this medication.  If you have a scheduling or an appointment question please contact our Health Unit Coordinator at [phone], Ext. [phone].    ALL nursing questions or concerns can be directed to your ENT nurse at: [phone] [place]

## 2021-04-01 NOTE — NURSING NOTE
"Chief Complaint   Patient presents with     Other     MQT testing       Initial /80 (BP Location: Left arm, Patient Position: Sitting, Cuff Size: Adult Regular)   Pulse 72   Temp 97.5  F (36.4  C)   Ht 1.753 m (5' 9\")   Wt 103.4 kg (228 lb)   SpO2 98%   BMI 33.67 kg/m   Estimated body mass index is 33.67 kg/m  as calculated from the following:    Height as of this encounter: 1.753 m (5' 9\").    Weight as of this encounter: 103.4 kg (228 lb).  Medication Reconciliation: complete    This patient presents today for allergy skin testing.      Patient has a h/o seasonal allergic rhinitis.  Grass, straw and hay exacerbate his symptoms. Symptoms have included h/o OM, chronic rhinitis, atopic dermatitis, pharyngitis, itchy/watery eyes and PND and symptoms are year round, but less in the winter.  He has a dx of DM II and asthma.  He doesn't have a current inhaler and his ACT was 25. He was previously on Singulair, but that caused hypoglycemia, so it was discontinued.   He discontinued SLIT because he felt it caused an increase in his asthma symptoms.  He has never had any previous sinus or ear surgeries.  He does have contact dermatitis.    This patient lives in an older single family home, that they are in the process of remodeling. There is a basement.  No mold, water or moisture issues in the home.  There is minimal carpet in the home, and he does have it in his bedroom.  Home has forced air and baseboard heat and they have a window air conditioning unit.        This patient has 1 dog and 2 cats for pets.  They are all inside.  The dog does sleep with him.    This patient has had allergy testing in 2014.  He started with SCIT, and made it through his silver vial and then switched to SLIT.  As stated above he only did SLIT x 2 months because he said it increased his asthma symptoms.    This patient's medications have been reviewed prior to testing and all appropriate medications have been stopped.    Consent is " signed by patient and signature is verified.     MQT/ID test is performed per protocol.  The patient tolerated testing well.  A dose of Claritin 10mg was given post testing.  Benadryl gel was applied to both testing sites. All findings are recorded on the paper flow sheet. Results are reviewed with this patient.  They are given written information regarding allergy.       The patient will follow-up with Brenda Castro PA-C for treatment plan.      Abdi Russo RN on 4/1/2021 at 10:54 AM

## 2021-04-01 NOTE — PROGRESS NOTES
Chief Complaint   Patient presents with     Other     MQT testing         Patient returns for MQT    Completed MQT today  MQT-   Dilution #6- Grass, alternatia  Dilution #5- Dog, Dust  Dilution #2- ragweed, pigweed, thistle, birch, maple, elm, oak, etienne, pine, cottonwood, walnut, molds, cat    Yung was seen in ENT in 2014 by Dr. Clayton. At that time he completed MQT and was on SLIT. He did discontinue SLIT as he felt it triggered an asthma attack.   He was decreased to a lower SLIT dose, Rinses, Astelin and Allegra.   He was lost to follow up and I do not see further refills noted.   He has felt since leavng the mine, he flet improvement.   He has felt grass, straw or hay worsens or exacerbates his symptoms.   Currently on Flonase, Zyrtec daily.   He feels some relief with his allergies.   Reports no recent asthma flares and no wheeze/ dyspnea.   Singulair caused hypoglycemia.     Resides in a house with a basement  There is no water or mold  Carpet in bedroom No  Heat in home Forced air/ wall unit  Animals 2 cats- 1 dog   Family hx of allergies Father   Past trials of medications- Claritin, Zyrtec.   Prior allergy testing- yes- 2014  MQT is scanned.      +patient has Dexcom meter and recurrent contact dermatitis. Did contact pharmacist and they are not aware of other options for device. He was instructed to contact Endocrinology.         No past medical history on file.     Allergies   Allergen Reactions     No Clinical Screening - See Comments Swelling     Pea pods- swelling of oral mucosa     Adhesive Tape      Codeine Nausea and Vomiting     Nicotine      Allergic to the patch     Current Outpatient Medications   Medication     albuterol (PROAIR HFA, PROVENTIL HFA, VENTOLIN HFA) 108 (90 BASE) MCG/ACT inhaler     atorvastatin (LIPITOR) 20 MG tablet     blood glucose (ACCU-CHEK CHERELLE) test strip     CINNAMON PO     fexofenadine (ALLEGRA) 180 MG tablet     fish oil-omega-3 fatty acids (FISH OIL) 1000 MG capsule  "    insulin aspart (NOVOLOG FLEXPEN) 100 UNIT/ML pen     insulin glargine (LANTUS) 100 UNIT/ML injection     LISINOPRIL PO     triamcinolone (KENALOG) 0.1 % external cream     VITAMIN E     No current facility-administered medications for this visit.       ROS: 10 point ROS neg other than the symptoms noted above in the HPI.  /80 (BP Location: Left arm, Patient Position: Sitting, Cuff Size: Adult Regular)   Pulse 72   Temp 97.5  F (36.4  C)   Ht 1.753 m (5' 9\")   Wt 103.4 kg (228 lb)   SpO2 98%   BMI 33.67 kg/m      General - The patient is well nourished and well developed, and appears to have good nutritional status.  Alert and oriented to person and place, answers questions and cooperates with examination appropriately.   Head and Face - Normocephalic and atraumatic, with no gross asymmetry noted.  The facial nerve is intact, with strong symmetric movements.  Voice and Breathing - The patient was breathing comfortably without the use of accessory muscles. There was no wheezing, stridor, or stertor.  The patients voice was clear and strong, and had appropriate pitch and quality.  Ears -The external auditory canals are patent, the tympanic membranes are intact without effusion, retraction or mass.  Bony landmarks are intact.  Eyes - Extraocular movements intact, and the pupils were reactive to light.  Sclera were not icteric or injected, conjunctiva were pink and moist.  Mouth - Examination of the oral cavity showed pink, healthy oral mucosa. No lesions or ulcerations noted.  The tongue was mobile and midline, and the dentition were in good condition.    Throat - The walls of the oropharynx were smooth, pink, moist, symmetric, and had no lesions or ulcerations.  The tonsillar pillars and soft palate were symmetric.  The uvula was midline on elevation.    Neck - Normal midline excursion of the laryngotracheal complex during swallowing.  Full range of motion on passive movement.  Palpation of the " occipital, submental, submandibular, internal jugular chain, and supraclavicular nodes did not demonstrate any abnormal lymph nodes or masses.  Palpation of the thyroid was soft and smooth, with no nodules or goiter appreciated.  The trachea was mobile and midline.  Nose - External contour is symmetric, no gross deflection or scars.  Nasal mucosa is pink and moist with no abnormal mucus.  The septum was intact and the turbinates are enlarged.  No polyps, masses, or purulence noted on examination.  Skin - Post test appearance.       ASSESSMENT:      ICD-10-CM    1. Seasonal allergic rhinitis due to pollen  J30.1 EPINEPHrine (ANY BX GENERIC EQUIV) 0.3 MG/0.3ML injection 2-pack     ORDER FOR ALLERGEN IMMUNOTHERAPY   2. Perennial allergic rhinitis  J30.89 EPINEPHrine (ANY BX GENERIC EQUIV) 0.3 MG/0.3ML injection 2-pack     ORDER FOR ALLERGEN IMMUNOTHERAPY   3. Allergic reaction, subsequent encounter  T78.40XD EPINEPHrine (ANY BX GENERIC EQUIV) 0.3 MG/0.3ML injection 2-pack     Start allergy injections.   He was on SCIT in past and felt he was able to tolerate SCIT without concerns. Course of SCIT reviewed again and patient wishes to proceed.     Continue with Zyrtec daily.   [facility]  Follow up in 6 months.         Brenda Castro PA-C  ENT  [facility], [place]  [phone]

## 2021-04-01 NOTE — PROGRESS NOTES
Frankie was seen for allergy skin testing. Patient was seen by this nurse in conjunction with ENT provider. All encounter details are documented in ENT Provider's appointment from this same date. Please see referenced encounter for this visits documentation.       Abdi Russo RN on 4/1/2021 at 10:57 AM

## 2021-04-02 ASSESSMENT — ASTHMA QUESTIONNAIRES: ACT_TOTALSCORE: 25

## 2021-04-07 ENCOUNTER — ALLIED HEALTH/NURSE VISIT (OUTPATIENT)
Dept: ALLERGY | Facility: OTHER | Age: 41
End: 2021-04-07
Attending: PHYSICIAN ASSISTANT
Payer: COMMERCIAL

## 2021-04-07 DIAGNOSIS — J30.2 SEASONAL ALLERGIC RHINITIS, UNSPECIFIED TRIGGER: Primary | ICD-10-CM

## 2021-04-07 PROCEDURE — 95165 ANTIGEN THERAPY SERVICES: CPT | Performed by: PHYSICIAN ASSISTANT

## 2021-04-07 NOTE — PROGRESS NOTES
Allergy serum is mixed today at Silver schedule 1of 4,  into  2  (5 ml)  multi dose vial/vials.    Allergens included were:    Ragweed  0.2 ml of dilution # 2  Pigweed  0.2 ml of dilution # 3  Mugwort 0.2 ml of dilution # 0  Kochia  0.2 ml of dilution # 0  Russian Thistle 0.2 ml of dilution # 2  Nico Grass 0.2 ml of dilution # 4  Birch mix 0.2 ml of dilution # 2  Maple Mix 0.2 of dilution # 2  Elm Mix 0.2 ml of dilution # 2  Oak Mix 0.2 ml of dilution # 2  Memo Mix 0.2 ml of dilution # 2  Pine Mix 0.2 ml of dilution # 2  Eastern [place] 0.2 ml of dilution # 2  Black [place] [employer] 0.2 ml of dilution # 2  Aspen 0.2 ml of dilution # 0  Red Cuming 0.2 ml of dilution # 0    Alternaria 0.2 ml of dilution # 4  Aspergillus 0.2 ml of dilution # 0  Hormodendrum 0.2 ml of dilution # 0  Helminthosporium 0.2 ml of dilution # 2  Penicillium 0.2 ml of dilution # 2  Epicoccum 0.2 ml of dilution # 2  Fusarium 0.2 ml of dilution # 2  Mucor 0.2 ml of dilution # 0  Grain Smut 0.2 ml of dilution # 0  Grass Smut 0.2 ml of dilution # 0  Cat 0.2 ml of dilution # 2  Dog 0.2 ml of dilution # 3  Feather Mix 0.2 ml of dilution # 0  Dust Mite Mix 0.2 ml of dilution # 3  Horse 0.2 ml of dilution # 0    Abdi Russo RN on 4/7/2021 at 10:40 AM

## 2021-05-04 ENCOUNTER — OFFICE VISIT (OUTPATIENT)
Dept: ALLERGY | Facility: OTHER | Age: 41
End: 2021-05-04
Attending: PHYSICIAN ASSISTANT
Payer: COMMERCIAL

## 2021-05-04 DIAGNOSIS — J30.89 PERENNIAL ALLERGIC RHINITIS: Primary | ICD-10-CM

## 2021-05-04 PROCEDURE — 95117 IMMUNOTHERAPY INJECTIONS: CPT

## 2021-05-04 NOTE — PROGRESS NOTES
Patient presents to allergy clinic for education and training on subcutaneous immunotherapy.  Patient educated on epi pen and an indications for use.  Epi-pen auto injector  was used for practice injection, and patient performed a return demonstration.  Patient verbalizes understanding.  New patient information sheet given and discussed anaphylaxis, local reactions and answered all questions to patients satisfaction.     Safety vial test was done in the left upper arm, for new Silver vial # 1.   Administered  0.01ml (ID) for SVT.  SVT = 7 mm.   Passed.    Safety vial test was done in the left lower arm, for new Silver vial # 2.   Administered  0.01ml (ID) for SVT.  SVT = 6 mm.   Passed.    Patient was given both safety vial tests and injections in the left arm as he has a continuous glucose monitor that is currently worn on his right arm.      Allergy injection/s given and charted on paper allergy flow sheet.  Patient experienced no reaction.  Epi pen is present today.     Velia Rodrigez RN

## 2021-05-12 ENCOUNTER — ALLIED HEALTH/NURSE VISIT (OUTPATIENT)
Dept: ALLERGY | Facility: OTHER | Age: 41
End: 2021-05-12
Attending: PHYSICIAN ASSISTANT
Payer: COMMERCIAL

## 2021-05-12 DIAGNOSIS — J30.89 PERENNIAL ALLERGIC RHINITIS: Primary | ICD-10-CM

## 2021-05-12 PROCEDURE — 95117 IMMUNOTHERAPY INJECTIONS: CPT

## 2021-05-12 NOTE — PROGRESS NOTES
Prior to injection verified pt identity using pt name and date of birth.    Allergy injection/s given and charted on paper allergy flow sheet.  Patient held 30 minutes, no reaction noted.    Abdi Russo RN on 5/12/2021 at 11:36 AM

## 2021-05-25 ENCOUNTER — ALLIED HEALTH/NURSE VISIT (OUTPATIENT)
Dept: ALLERGY | Facility: OTHER | Age: 41
End: 2021-05-25
Attending: PHYSICIAN ASSISTANT
Payer: COMMERCIAL

## 2021-05-25 DIAGNOSIS — J30.89 PERENNIAL ALLERGIC RHINITIS: Primary | ICD-10-CM

## 2021-05-25 PROCEDURE — 95117 IMMUNOTHERAPY INJECTIONS: CPT

## 2021-05-25 NOTE — PROGRESS NOTES
Prior to injection verified pt identity using pt name and date of birth.    Allergy injection/s given and charted on paper allergy flow sheet.  Patient left AMA, signing form, not staying for the observation period.    Abdi Russo RN on 5/25/2021 at 8:18 AM

## 2021-06-02 ENCOUNTER — ALLIED HEALTH/NURSE VISIT (OUTPATIENT)
Dept: ALLERGY | Facility: OTHER | Age: 41
End: 2021-06-02
Attending: PHYSICIAN ASSISTANT
Payer: COMMERCIAL

## 2021-06-02 DIAGNOSIS — J30.89 PERENNIAL ALLERGIC RHINITIS: Primary | ICD-10-CM

## 2021-06-02 PROCEDURE — 95117 IMMUNOTHERAPY INJECTIONS: CPT

## 2021-06-02 NOTE — PROGRESS NOTES
Prior to injection verified pt identity using pt name and date of birth.    Allergy injection/s given and charted on paper allergy flow sheet.  Patient left AMA, signing form, not staying for the observation period.    Abdi Russo RN on 6/2/2021 at 8:06 AM

## 2021-09-14 ENCOUNTER — ALLIED HEALTH/NURSE VISIT (OUTPATIENT)
Dept: FAMILY MEDICINE | Facility: OTHER | Age: 41
End: 2021-09-14
Attending: FAMILY MEDICINE
Payer: COMMERCIAL

## 2021-09-14 DIAGNOSIS — Z20.822 EXPOSURE TO COVID-19 VIRUS: Primary | ICD-10-CM

## 2021-09-14 PROCEDURE — C9803 HOPD COVID-19 SPEC COLLECT: HCPCS

## 2021-09-14 PROCEDURE — U0003 INFECTIOUS AGENT DETECTION BY NUCLEIC ACID (DNA OR RNA); SEVERE ACUTE RESPIRATORY SYNDROME CORONAVIRUS 2 (SARS-COV-2) (CORONAVIRUS DISEASE [COVID-19]), AMPLIFIED PROBE TECHNIQUE, MAKING USE OF HIGH THROUGHPUT TECHNOLOGIES AS DESCRIBED BY CMS-2020-01-R: HCPCS | Mod: ZL

## 2021-09-14 NOTE — PROGRESS NOTES
Patient swabbed for COVID-19 testing.  Norma J. Gosselin, LPN on 9/14/2021 at 9:40 AM    Exposure

## 2021-09-16 ENCOUNTER — HOSPITAL ENCOUNTER (EMERGENCY)
Facility: HOSPITAL | Age: 41
Discharge: LEFT BEFORE DISCHARGE | End: 2021-09-16
Admitting: EMERGENCY MEDICINE
Payer: COMMERCIAL

## 2021-09-16 VITALS
SYSTOLIC BLOOD PRESSURE: 132 MMHG | RESPIRATION RATE: 18 BRPM | BODY MASS INDEX: 35.44 KG/M2 | HEART RATE: 80 BPM | OXYGEN SATURATION: 98 % | DIASTOLIC BLOOD PRESSURE: 90 MMHG | TEMPERATURE: 98.1 F | WEIGHT: 240 LBS

## 2021-09-16 LAB — SARS-COV-2 RNA RESP QL NAA+PROBE: NEGATIVE

## 2021-09-16 PROCEDURE — 999N000104 HC STATISTIC NO CHARGE

## 2021-09-16 NOTE — ED TRIAGE NOTES
Pt in for evaluation of right lower leg insect bite. Reports he has a wood tick in the area around 2 weeks ago. Reports since the bite he has itching and increased redness in the area. Concerned for possible lymes. Has hs of DM as well and BGT's have been really elevated.

## 2021-10-14 ENCOUNTER — TRANSFERRED RECORDS (OUTPATIENT)
Dept: HEALTH INFORMATION MANAGEMENT | Facility: HOSPITAL | Age: 41
End: 2021-10-14

## 2022-03-31 ENCOUNTER — HOSPITAL ENCOUNTER (EMERGENCY)
Facility: OTHER | Age: 42
Discharge: HOME | End: 2022-03-31
Attending: EMERGENCY MEDICINE | Admitting: EMERGENCY MEDICINE
Payer: COMMERCIAL

## 2022-03-31 VITALS
TEMPERATURE: 96.8 F | SYSTOLIC BLOOD PRESSURE: 135 MMHG | HEART RATE: 91 BPM | OXYGEN SATURATION: 96 % | DIASTOLIC BLOOD PRESSURE: 97 MMHG

## 2022-03-31 DIAGNOSIS — J02.9 ACUTE PHARYNGITIS, UNSPECIFIED ETIOLOGY: ICD-10-CM

## 2022-03-31 LAB — GROUP A STREP BY PCR: NOT DETECTED

## 2022-03-31 PROCEDURE — 99283 EMERGENCY DEPT VISIT LOW MDM: CPT | Performed by: STUDENT IN AN ORGANIZED HEALTH CARE EDUCATION/TRAINING PROGRAM

## 2022-03-31 PROCEDURE — 99283 EMERGENCY DEPT VISIT LOW MDM: CPT | Performed by: EMERGENCY MEDICINE

## 2022-03-31 PROCEDURE — 99282 EMERGENCY DEPT VISIT SF MDM: CPT | Performed by: EMERGENCY MEDICINE

## 2022-03-31 PROCEDURE — 87651 STREP A DNA AMP PROBE: CPT | Performed by: EMERGENCY MEDICINE

## 2022-03-31 RX ORDER — ACETAMINOPHEN 500 MG
1000 TABLET ORAL ONCE
Status: DISCONTINUED | OUTPATIENT
Start: 2022-03-31 | End: 2022-03-31 | Stop reason: HOSPADM

## 2022-03-31 NOTE — DISCHARGE INSTRUCTIONS
Your strep test was negative. You likely have a viral cause of your sore throat. This should improve in 1 to 2 days.    Tylenol 500-1000 mg every 6 hours as needed for pain.  Do not take more than 4000 mg per day     Ibuprofen 600 mg every 6 hours as needed for fever with food and plenty of water    You may alternate these medications every 6 hours for fever control    Remain well-hydrated with 1-2 L of water daily    Consider including a 12 ounce sports drink once daily while ill    [place] diet as tolerated    See your primary doctor in 2 days if not improving    Return to the emergency department for fever greater than 101 that does not respond to medication, nausea and vomiting such that you can stay hydrated, lightheadedness/dizziness, worsening pain, headache or neck stiffness, rash, or if you have any new or changing symptoms or concerns

## 2022-03-31 NOTE — ED TRIAGE NOTES
Pt presents to ED with sore throat for 12 hours.  Afebrile.  States he has chronic strep throat, but was last treated 6 years ago with medication.  Explains difficulty controlling blood sugars for last 24 hours.  Chela Hicks RN.............................3/31/2022 6:31 AM

## 2022-03-31 NOTE — ED PROVIDER NOTES
Continuation of care note  ED Course as of 03/31/22 0710   Thu Mar 31, 2022   0703 Signed out to oncoming provider pending results.   0708 I assumed care of patient at shift change; here with sore throat starting last night, afebrile here. Getting tylenol, strep test pending. Anticipate discharge   0709 Strep test negative, will re-evaluate patient     Patient re-evaluated, feeling well. Discussed decadron for symptomatic pharyngitis relief but due to his diabetes and some decreased control of blood glucose (up to 270's at one point, now remains more in range in the mid-100's) will avoid this to prevent worsening hyperglycemia. Appropriate for discharge with close outpatient follow-up, strict return precautions reviewed.    From ED discharge instructions:  Your strep test was negative. You likely have a viral cause of your sore throat. This should improve in 1 to 2 days.    Tylenol 500-1000 mg every 6 hours as needed for pain.  Do not take more than 4000 mg per day     Ibuprofen 600 mg every 6 hours as needed for fever with food and plenty of water    You may alternate these medications every 6 hours for fever control    Remain well-hydrated with 1-2 L of water daily    Consider including a 12 ounce sports drink once daily while ill    [place] diet as tolerated    See your primary doctor in 2 days if not improving    Return to the emergency department for fever greater than 101 that does not respond to medication, nausea and vomiting such that you can stay hydrated, lightheadedness/dizziness, worsening pain, headache or neck stiffness, rash, or if you have any new or changing symptoms or concerns    Clinical impression:  1. Pharyngitis, likely viral     Loco Mcpherson MD  03/31/22 2540

## 2022-03-31 NOTE — ED PROVIDER NOTES
History     Chief Complaint   Patient presents with     Pharyngitis     HPI  Frankie Troncoso is a 42 year old male who presents with sore throat.  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  He first noticed a sore throat and then felt sweaty and developed a headache.  He took an ibuprofen and the sweating is resolved, but he still has a sore throat.  He is concerned that he was exposed to a coworker with strep.  He denies cough.  He denies chest pain or shortness of breath.  He denies nausea and vomiting.  He denies rash.  Reports that he had difficulty controlling his blood sugar this morning, required much higher dose of insulin than usual.    Allergies:  Allergies   Allergen Reactions     Other [No Clinical Screening - See Comments] Swelling     Pea pods- swelling of oral mucosa     Adhesive Tape      Codeine Nausea and Vomiting     Food [Peas]      Pea pods that are store bought, not grown at home     Nicotine      Allergic to the patch       Problem List:    Patient Active Problem List    Diagnosis Date Noted     Diabetes mellitus type 1 (H) 07/16/2014     Priority: Medium     Allergic rhinitis due to pollen 01/21/2014     Priority: Medium     Seasonal allergic rhinitis 12/03/2013     Priority: Medium     Sleep-disordered breathing 12/03/2013     Priority: Medium        Past Medical History:    No past medical history on file.    Past Surgical History:    No past surgical history on file.    Family History:    Family History   Problem Relation Age of Onset     Alcohol/Drug Father         alcoholism     C.A.D. Father      Cerebrovascular Disease Father         CVA     Allergies Father      Asthma Father      Heart Disease Other         heart disease - family h/o       Social History:  Marital Status:   [2]  Social History     Tobacco Use     Smoking status: Former Smoker     Packs/day: 2.00     Years: 10.00     Pack years: 20.00     Types: Cigarettes     Smokeless  tobacco: Never Used   Substance Use Topics     Alcohol use: Yes     Drug use: No        Medications:    albuterol (PROAIR HFA, PROVENTIL HFA, VENTOLIN HFA) 108 (90 BASE) MCG/ACT inhaler  atorvastatin (LIPITOR) 20 MG tablet  blood glucose (ACCU-CHEK CHERELLE) test strip  CINNAMON PO  EPINEPHrine (ANY BX GENERIC EQUIV) 0.3 MG/0.3ML injection 2-pack  fexofenadine (ALLEGRA) 180 MG tablet  fish oil-omega-3 fatty acids (FISH OIL) 1000 MG capsule  insulin aspart (NOVOLOG FLEXPEN) 100 UNIT/ML pen  insulin glargine (LANTUS) 100 UNIT/ML injection  LISINOPRIL PO  ORDER FOR ALLERGEN IMMUNOTHERAPY  triamcinolone (KENALOG) 0.1 % external cream  VITAMIN E          Review of Systems  Please seen HPI for pertinent positives and negatives. All other systems reviewed and found to be negative.   Physical Exam   BP: (!) 135/97  Pulse: 91  Temp: 96.8  F (36  C)  SpO2: 97 %      Physical Exam  Constitutional:       General: He is not in acute distress.     Appearance: He is not ill-appearing.   HENT:      Head: Normocephalic and atraumatic.      Mouth/Throat:      Mouth: Mucous membranes are moist.      Pharynx: Oropharynx is clear. No oropharyngeal exudate.   Cardiovascular:      Rate and Rhythm: Normal rate and regular rhythm.   Pulmonary:      Effort: Pulmonary effort is normal.      Breath sounds: Normal breath sounds.   Lymphadenopathy:      Cervical: Cervical adenopathy present.   Skin:     General: Skin is warm and dry.   Neurological:      Mental Status: He is alert.         ED Course              ED Course as of 03/31/22 0704   Thu Mar 31, 2022   0703 Signed out to oncoming provider pending results.     Procedures              Critical Care time:  none               No results found for this or any previous visit (from the past 24 hour(s)).    Medications   acetaminophen (TYLENOL) tablet 1,000 mg (has no administration in time range)       Assessments & Plan (with Medical Decision Making)     I have reviewed the nursing notes.    I  have reviewed the findings, diagnosis, plan and need for follow up with the patient.   Mr Troncoso is a 42-year-old man who presents with sore throat, sweating, and headache today. Apparently early in the course of an upper respiratory illness.  He is concerned for strep.  Centor score 2, optional testing.  Patient would like to be tested.  He is not interested in Covid testing.    New Prescriptions    No medications on file       Final diagnoses:   None       3/31/2022   [facility]     Ava Love MD  03/31/22 0716

## 2022-04-11 ENCOUNTER — HOSPITAL ENCOUNTER (OUTPATIENT)
Dept: GENERAL RADIOLOGY | Facility: OTHER | Age: 42
Discharge: HOME | End: 2022-04-11
Attending: FAMILY MEDICINE
Payer: COMMERCIAL

## 2022-04-11 ENCOUNTER — OFFICE VISIT (OUTPATIENT)
Dept: FAMILY MEDICINE | Facility: OTHER | Age: 42
End: 2022-04-11
Attending: FAMILY MEDICINE
Payer: COMMERCIAL

## 2022-04-11 ENCOUNTER — TELEPHONE (OUTPATIENT)
Dept: FAMILY MEDICINE | Facility: OTHER | Age: 42
End: 2022-04-11

## 2022-04-11 VITALS
DIASTOLIC BLOOD PRESSURE: 80 MMHG | RESPIRATION RATE: 20 BRPM | HEART RATE: 97 BPM | HEIGHT: 69 IN | BODY MASS INDEX: 35.55 KG/M2 | WEIGHT: 240 LBS | SYSTOLIC BLOOD PRESSURE: 136 MMHG | OXYGEN SATURATION: 98 % | TEMPERATURE: 98.6 F

## 2022-04-11 DIAGNOSIS — R06.2 WHEEZING: ICD-10-CM

## 2022-04-11 DIAGNOSIS — J20.9 ACUTE BRONCHITIS, UNSPECIFIED ORGANISM: Primary | ICD-10-CM

## 2022-04-11 DIAGNOSIS — E10.9 TYPE 1 DIABETES MELLITUS WITHOUT COMPLICATION (H): ICD-10-CM

## 2022-04-11 DIAGNOSIS — E66.01 MORBID OBESITY (H): ICD-10-CM

## 2022-04-11 LAB
ANION GAP SERPL CALCULATED.3IONS-SCNC: 6 MMOL/L (ref 3–14)
BASOPHILS # BLD AUTO: 0 10E3/UL (ref 0–0.2)
BASOPHILS NFR BLD AUTO: 0 %
BUN SERPL-MCNC: 11 MG/DL (ref 7–25)
CALCIUM SERPL-MCNC: 9.6 MG/DL (ref 8.6–10.3)
CHLORIDE BLD-SCNC: 106 MMOL/L (ref 98–107)
CO2 SERPL-SCNC: 29 MMOL/L (ref 21–31)
CREAT SERPL-MCNC: 1.07 MG/DL (ref 0.7–1.3)
EOSINOPHIL # BLD AUTO: 0.3 10E3/UL (ref 0–0.7)
EOSINOPHIL NFR BLD AUTO: 5 %
ERYTHROCYTE [DISTWIDTH] IN BLOOD BY AUTOMATED COUNT: 13.3 % (ref 10–15)
FLUAV RNA SPEC QL NAA+PROBE: NEGATIVE
FLUBV RNA RESP QL NAA+PROBE: NEGATIVE
GFR SERPL CREATININE-BSD FRML MDRD: 88 ML/MIN/1.73M2
GLUCOSE BLD-MCNC: 117 MG/DL (ref 70–105)
HCT VFR BLD AUTO: 44.9 % (ref 40–53)
HGB BLD-MCNC: 15 G/DL (ref 13.3–17.7)
IMM GRANULOCYTES # BLD: 0 10E3/UL
IMM GRANULOCYTES NFR BLD: 1 %
LYMPHOCYTES # BLD AUTO: 2 10E3/UL (ref 0.8–5.3)
LYMPHOCYTES NFR BLD AUTO: 32 %
MCH RBC QN AUTO: 29.4 PG (ref 26.5–33)
MCHC RBC AUTO-ENTMCNC: 33.4 G/DL (ref 31.5–36.5)
MCV RBC AUTO: 88 FL (ref 78–100)
MONOCYTES # BLD AUTO: 1 10E3/UL (ref 0–1.3)
MONOCYTES NFR BLD AUTO: 16 %
NEUTROPHILS # BLD AUTO: 2.9 10E3/UL (ref 1.6–8.3)
NEUTROPHILS NFR BLD AUTO: 46 %
NRBC # BLD AUTO: 0 10E3/UL
NRBC BLD AUTO-RTO: 0 /100
PLATELET # BLD AUTO: 273 10E3/UL (ref 150–450)
POTASSIUM BLD-SCNC: 4 MMOL/L (ref 3.5–5.1)
PROCALCITONIN SERPL-MCNC: 4.49 NG/ML
RBC # BLD AUTO: 5.11 10E6/UL (ref 4.4–5.9)
RSV RNA SPEC NAA+PROBE: NEGATIVE
SARS-COV-2 RNA RESP QL NAA+PROBE: NEGATIVE
SODIUM SERPL-SCNC: 141 MMOL/L (ref 134–144)
WBC # BLD AUTO: 6.3 10E3/UL (ref 4–11)

## 2022-04-11 PROCEDURE — 80048 BASIC METABOLIC PNL TOTAL CA: CPT | Mod: ZL | Performed by: FAMILY MEDICINE

## 2022-04-11 PROCEDURE — 99214 OFFICE O/P EST MOD 30 MIN: CPT | Performed by: FAMILY MEDICINE

## 2022-04-11 PROCEDURE — C9803 HOPD COVID-19 SPEC COLLECT: HCPCS | Performed by: FAMILY MEDICINE

## 2022-04-11 PROCEDURE — 87637 SARSCOV2&INF A&B&RSV AMP PRB: CPT | Mod: ZL | Performed by: FAMILY MEDICINE

## 2022-04-11 PROCEDURE — 71046 X-RAY EXAM CHEST 2 VIEWS: CPT

## 2022-04-11 PROCEDURE — 84145 PROCALCITONIN (PCT): CPT | Mod: ZL | Performed by: FAMILY MEDICINE

## 2022-04-11 PROCEDURE — 36415 COLL VENOUS BLD VENIPUNCTURE: CPT | Mod: ZL | Performed by: FAMILY MEDICINE

## 2022-04-11 PROCEDURE — 85025 COMPLETE CBC W/AUTO DIFF WBC: CPT | Mod: ZL | Performed by: FAMILY MEDICINE

## 2022-04-11 RX ORDER — ALBUTEROL SULFATE 90 UG/1
2 AEROSOL, METERED RESPIRATORY (INHALATION) EVERY 4 HOURS PRN
Qty: 18 G | Refills: 0 | Status: SHIPPED | OUTPATIENT
Start: 2022-04-11 | End: 2022-04-11

## 2022-04-11 RX ORDER — ALBUTEROL SULFATE 90 UG/1
2 AEROSOL, METERED RESPIRATORY (INHALATION) EVERY 4 HOURS PRN
Qty: 18 G | Refills: 0 | Status: SHIPPED | OUTPATIENT
Start: 2022-04-11 | End: 2023-01-09

## 2022-04-11 RX ORDER — AZITHROMYCIN 250 MG/1
TABLET, FILM COATED ORAL
Qty: 6 TABLET | Refills: 0 | Status: SHIPPED | OUTPATIENT
Start: 2022-04-11 | End: 2023-01-09

## 2022-04-11 RX ORDER — PREDNISONE 20 MG/1
20 TABLET ORAL 2 TIMES DAILY
Qty: 10 TABLET | Refills: 0 | Status: SHIPPED | OUTPATIENT
Start: 2022-04-11 | End: 2022-04-11

## 2022-04-11 RX ORDER — AZITHROMYCIN 250 MG/1
TABLET, FILM COATED ORAL
Qty: 6 TABLET | Refills: 0 | Status: SHIPPED | OUTPATIENT
Start: 2022-04-11 | End: 2022-04-11

## 2022-04-11 RX ORDER — PREDNISONE 20 MG/1
20 TABLET ORAL 2 TIMES DAILY
Qty: 10 TABLET | Refills: 0 | Status: SHIPPED | OUTPATIENT
Start: 2022-04-11 | End: 2023-01-09

## 2022-04-11 ASSESSMENT — PAIN SCALES - GENERAL: PAINLEVEL: SEVERE PAIN (6)

## 2022-04-11 NOTE — PROGRESS NOTES
ASSESSMENT/PLAN:     1. Acute bronchitis, unspecified organism    2. Wheezing    3. Morbid obesity (H)    4. Type 1 diabetes mellitus without complication (H)        Assessment & Plan   Problem List Items Addressed This Visit        Digestive    Morbid obesity (H)       Endocrine    Diabetes mellitus type 1 (H)    Relevant Medications    predniSONE (DELTASONE) 20 MG tablet      Other Visit Diagnoses     Acute bronchitis, unspecified organism    -  Primary    Relevant Medications    albuterol (PROAIR HFA/PROVENTIL HFA/VENTOLIN HFA) 108 (90 Base) MCG/ACT inhaler    azithromycin (ZITHROMAX) 250 MG tablet    predniSONE (DELTASONE) 20 MG tablet    Wheezing        Relevant Medications    albuterol (PROAIR HFA/PROVENTIL HFA/VENTOLIN HFA) 108 (90 Base) MCG/ACT inhaler    Other Relevant Orders    XR Chest 2 Views (Completed)    CBC and Differential (Completed)    Procalcitonin (Completed)    Basic Metabolic Panel (Completed)    Symptomatic; Yes; 4/8/2022 Influenza A/B & SARS-CoV2 (COVID-19) Virus PCR Multiplex (Completed)          Review of the result(s) of each unique test - I have personally reviewed the imaging test listed below.    I have personally reviewed the labs listed below.     1.  Clinically, there was a high suspicion for pneumonia. Chest xray was negative for infiltrate.  Elevated procalcitonin would support infection.  Treatment to be started with zithromax and prednisone for 5 days and albuterol prn.  Patient should have a low threshold to return for follow-up evaluation.  2.  Discussed potential diabetes complications with his acute illness and prednisone.  He will adjust his pump accordingly.  We also discussed staying hydrated.  Of note, he currently has a normal serum bicarbonate level.          PDMP Review     None            GABRIELLA DE LA ROSA MD, FAAFP  [facility]      NURSING NOTES:  Nursing Notes:   Priti Vasquez LPN  4/11/2022  5:59 PM  Incomplete  Chief Complaint  "  Patient presents with     Sinus Problem     Respiratory Problems     Patient presented to the clinic with sinus issues that started about four days ago and shortness of breath two days ago.    Initial There were no vitals taken for this visit. Estimated body mass index is 35.44 kg/m  as calculated from the following:    Height as of 4/1/21: 1.753 m (5' 9\").    Weight as of 9/16/21: 108.9 kg (240 lb).       FOOD SECURITY SCREENING QUESTIONS:    The next two questions are to help us understand your food security.  If you are feeling you need any assistance in this area, we have resources available to support you today.    Hunger Vital Signs:  Within the past 12 months we worried whether our food would run out before we got money to buy more. Never  Within the past 12 months the food we bought just didn't last and we didn't have money to get more. Never      Medication Reconciliation: Complete      Priti Vasquez LPN       SUBJECTIVE:    Frankie Troncoso is a 42 year old male  who presents for the following health issues:  Sinus symptoms and short of breath    HPI  Frankie Troncoso is a 42 year old male presents for snus symptoms and short of breath.    Onset 4 days ago.  He has productive cough, crackling in his lungs and plugged in his head.  + headache and body aches  Taking ibuprofen and mucinex. - last took 2 hours ago.  mucinex  - not helping much at all.    Harder to breathe in than out.    Slept most of today.    Has been sweating and chilled.       Patient with type 1 diabetes  - states bs have been high, 257 was the highest, been bolusing himself through the day.    He hasn't been eating much.      No history of chronic respiratory disease       Allergies   Allergen Reactions     Other [No Clinical Screening - See Comments] Swelling     Pea pods- swelling of oral mucosa     Adhesive Tape      Codeine Nausea and Vomiting     Food [Peas]      Pea pods that are store bought, not grown at home     " "Nicotine      Allergic to the patch     Current Outpatient Medications   Medication     albuterol (PROAIR HFA, PROVENTIL HFA, VENTOLIN HFA) 108 (90 BASE) MCG/ACT inhaler     albuterol (PROAIR HFA/PROVENTIL HFA/VENTOLIN HFA) 108 (90 Base) MCG/ACT inhaler     atorvastatin (LIPITOR) 20 MG tablet     azithromycin (ZITHROMAX) 250 MG tablet     CINNAMON PO     EPINEPHrine (ANY BX GENERIC EQUIV) 0.3 MG/0.3ML injection 2-pack     fexofenadine (ALLEGRA) 180 MG tablet     fish oil-omega-3 fatty acids 1000 MG capsule     insulin aspart (NOVOLOG FLEXPEN) 100 UNIT/ML pen     insulin glargine (LANTUS) 100 UNIT/ML injection     LISINOPRIL PO     predniSONE (DELTASONE) 20 MG tablet     triamcinolone (KENALOG) 0.1 % external cream     VITAMIN E     No current facility-administered medications for this visit.      No past medical history on file.   No past surgical history on file.    Review of Systems     PHQ-2 Score:     PHQ-2 ( 1999 Pfizer) 4/11/2022 11/15/2013   Q1: Little interest or pleasure in doing things 0 0   Q2: Feeling down, depressed or hopeless 0 0   PHQ-2 Score 0 0         No flowsheet data found.  No flowsheet data found.        OBJECTIVE:     Objective  /80 (BP Location: Left arm, Patient Position: Sitting, Cuff Size: Adult Large)   Pulse 97   Temp 98.6  F (37  C) (Tympanic)   Resp 20   Ht 1.753 m (5' 9\")   Wt 108.9 kg (240 lb)   SpO2 98%   BMI 35.44 kg/m   Body mass index is 35.44 kg/m .    Wt Readings from Last 4 Encounters:   04/11/22 108.9 kg (240 lb)   09/16/21 108.9 kg (240 lb)   04/01/21 103.4 kg (228 lb)   02/18/21 102.5 kg (226 lb)       Nursing notes and VS reviewed    Physical Exam   GENERAL: mildly hoarse voice  HEENT:  TMs normal, throat normal   NECK: no adenopathy, no asymmetry, masses, or scars and thyroid normal to palpation  RESP: Expiratory wheezing, bilateral crackles  CV: regular rate and rhythm, no murmur          Results for orders placed or performed in visit on 04/11/22   XR " Chest 2 Views     Status: None    Narrative    Procedure:XR CHEST 2 VW    Clinical history:Male, 42 years, Wheezing    Technique: Two views are submitted.    Comparison: 2/11/2013    Findings: The cardiac silhouette is normal. The pulmonary vasculature  is normal.    The lungs are clear. Bony structures are unremarkable.      Impression    Impression:   No acute abnormality. No evidence of acute or active cardiopulmonary  disease.    ZOE MERAZ MD         SYSTEM ID:  [id number]   Symptomatic; Yes; 4/8/2022 Influenza A/B & SARS-CoV2 (COVID-19) Virus PCR Multiplex Nose     Status: Normal    Specimen: Nose; Swab   Result Value Ref Range    Influenza A PCR Negative Negative    Influenza B PCR Negative Negative    RSV PCR Negative Negative    SARS CoV2 PCR Negative Negative    Narrative    Testing was performed using the Xpert Xpress CoV2/Flu/RSV Assay on the Cepheid GeneXpert Instrument. This test should be ordered for the detection of SARS-CoV-2 and influenza viruses in individuals who meet clinical and/or epidemiological criteria. Test performance is unknown in asymptomatic patients. This test is for in vitro diagnostic use under the FDA EUA for laboratories certified under CLIA to perform high or moderate complexity testing. This test has not been FDA cleared or approved. A negative result does not rule out the presence of PCR inhibitors in the specimen or target RNA in concentration below the limit of detection for the assay. If only one viral target is positive but coinfection with multiple targets is suspected, the sample should be re-tested with another FDA cleared, approved, or authorized test, if coinfection would change clinical management. This test was validated by the [facility]. These laboratories are certified under the Clinical  Laboratory Improvement Amendments of 1988 (CLIA-88) as qualified to perform high complexity laboratory testing.   Basic Metabolic Panel     Status:  Abnormal   Result Value Ref Range    Sodium 141 134 - 144 mmol/L    Potassium 4.0 3.5 - 5.1 mmol/L    Chloride 106 98 - 107 mmol/L    Carbon Dioxide (CO2) 29 21 - 31 mmol/L    Anion Gap 6 3 - 14 mmol/L    Urea Nitrogen 11 7 - 25 mg/dL    Creatinine 1.07 0.70 - 1.30 mg/dL    Calcium 9.6 8.6 - 10.3 mg/dL    Glucose 117 (H) 70 - 105 mg/dL    GFR Estimate 88 >60 mL/min/1.73m2   Procalcitonin     Status: Normal   Result Value Ref Range    Procalcitonin 4.49 <0.50 ng/mL ng/mL   CBC with platelets and differential     Status: None   Result Value Ref Range    WBC Count 6.3 4.0 - 11.0 10e3/uL    RBC Count 5.11 4.40 - 5.90 10e6/uL    Hemoglobin 15.0 13.3 - 17.7 g/dL    Hematocrit 44.9 40.0 - 53.0 %    MCV 88 78 - 100 fL    MCH 29.4 26.5 - 33.0 pg    MCHC 33.4 31.5 - 36.5 g/dL    RDW 13.3 10.0 - 15.0 %    Platelet Count 273 150 - 450 10e3/uL    % Neutrophils 46 %    % Lymphocytes 32 %    % Monocytes 16 %    % Eosinophils 5 %    % Basophils 0 %    % Immature Granulocytes 1 %    NRBCs per 100 WBC 0 <1 /100    Absolute Neutrophils 2.9 1.6 - 8.3 10e3/uL    Absolute Lymphocytes 2.0 0.8 - 5.3 10e3/uL    Absolute Monocytes 1.0 0.0 - 1.3 10e3/uL    Absolute Eosinophils 0.3 0.0 - 0.7 10e3/uL    Absolute Basophils 0.0 0.0 - 0.2 10e3/uL    Absolute Immature Granulocytes 0.0 <=0.4 10e3/uL    Absolute NRBCs 0.0 10e3/uL   CBC and Differential     Status: None    Narrative    The following orders were created for panel order CBC and Differential.  Procedure                               Abnormality         Status                     ---------                               -----------         ------                     CBC with platelets and d...[[id number]]                      Final result                 Please view results for these tests on the individual orders.

## 2022-04-11 NOTE — LETTER
April 11, 2022      Frankie MORALES Aba  [address] [place] [id number]        To Whom It May Concern:    Frankie Troncoso was seen in our clinic 4/11/2022 with respiratory illness and should not be at work tonight.      Sincerely,        GABRIELLA DE LA ROSA MD

## 2022-04-11 NOTE — NURSING NOTE
"Chief Complaint   Patient presents with     Sinus Problem     Respiratory Problems     Patient presented to the clinic with sinus issues that started about four days ago and shortness of breath two days ago.    Initial /80 (BP Location: Left arm, Patient Position: Sitting, Cuff Size: Adult Large)   Pulse 97   Temp 98.6  F (37  C) (Tympanic)   Resp 20   Ht 1.753 m (5' 9\")   Wt 108.9 kg (240 lb)   SpO2 98%   BMI 35.44 kg/m   Estimated body mass index is 35.44 kg/m  as calculated from the following:    Height as of this encounter: 1.753 m (5' 9\").    Weight as of this encounter: 108.9 kg (240 lb).       FOOD SECURITY SCREENING QUESTIONS:    The next two questions are to help us understand your food security.  If you are feeling you need any assistance in this area, we have resources available to support you today.    Hunger Vital Signs:  Within the past 12 months we worried whether our food would run out before we got money to buy more. Never  Within the past 12 months the food we bought just didn't last and we didn't have money to get more. Never      Medication Reconciliation: Complete      Priti Vasquez LPN  "

## 2022-04-12 ENCOUNTER — TELEPHONE (OUTPATIENT)
Dept: FAMILY MEDICINE | Facility: OTHER | Age: 42
End: 2022-04-12
Payer: COMMERCIAL

## 2022-04-12 NOTE — TELEPHONE ENCOUNTER
Patient called saying he got his MyChart set up and was hoping to get his work note sent through there.  Thelma Combs on 4/12/2022 at 11:49 AM

## 2022-05-14 ENCOUNTER — HEALTH MAINTENANCE LETTER (OUTPATIENT)
Age: 42
End: 2022-05-14

## 2022-09-04 ENCOUNTER — HEALTH MAINTENANCE LETTER (OUTPATIENT)
Age: 42
End: 2022-09-04

## 2022-09-06 ENCOUNTER — OFFICE VISIT (OUTPATIENT)
Dept: FAMILY MEDICINE | Facility: OTHER | Age: 42
End: 2022-09-06
Attending: PHYSICIAN ASSISTANT
Payer: COMMERCIAL

## 2022-09-06 VITALS
OXYGEN SATURATION: 98 % | SYSTOLIC BLOOD PRESSURE: 136 MMHG | HEART RATE: 91 BPM | DIASTOLIC BLOOD PRESSURE: 88 MMHG | TEMPERATURE: 97.5 F | RESPIRATION RATE: 18 BRPM | BODY MASS INDEX: 35.44 KG/M2 | HEIGHT: 69 IN | WEIGHT: 239.3 LBS
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
## 2022-09-06 PROCEDURE — 99213 OFFICE O/P EST LOW 20 MIN: CPT | Performed by: PHYSICIAN ASSISTANT

## 2022-09-06 RX ORDER — DOXYCYCLINE 100 MG/1
100 CAPSULE ORAL 2 TIMES DAILY
Qty: 20 CAPSULE | Refills: 0 | Status: SHIPPED | OUTPATIENT
Start: 2022-09-06 | End: 2022-09-06

## 2022-09-06 RX ORDER — METRONIDAZOLE 500 MG/1
500 TABLET ORAL 3 TIMES DAILY
Qty: 30 TABLET | Refills: 0 | Status: SHIPPED | OUTPATIENT
Start: 2022-09-06 | End: 2023-01-09

## 2022-09-06 RX ORDER — METRONIDAZOLE 500 MG/1
500 TABLET ORAL 3 TIMES DAILY
Qty: 30 TABLET | Refills: 0 | Status: SHIPPED | OUTPATIENT
Start: 2022-09-06 | End: 2022-09-06

## 2022-09-06 RX ORDER — DOXYCYCLINE 100 MG/1
100 CAPSULE ORAL 2 TIMES DAILY
Qty: 20 CAPSULE | Refills: 0 | Status: SHIPPED | OUTPATIENT
Start: 2022-09-06 | End: 2023-01-09

## 2022-09-06 ASSESSMENT — PAIN SCALES - GENERAL: PAINLEVEL: MODERATE PAIN (4)

## 2022-09-06 NOTE — PATIENT INSTRUCTIONS
You were prescribed an antibiotic, please take into consideration the following information:  - Take entire course of antibiotic even if you start to feel better.  - Antibiotics can cause stomach upset including nausea and diarrhea. Read your bottle or ask the pharmacist if antibiotic can be taken with food to help prevent nausea. If you have symptoms of diarrhea you can take an over-the-counter probiotic and/or increase foods with probiotics such as yogurt, Nicolás, sauerkraut.  -Use caution in sunlight as can lead to increased risk of sunburn while on ABX (antibiotics).     Please refer to your AVS for follow up and pain/symptoms management recommendations (I.e.: medications, helpful conservative treatment modalities, appropriate follow up if need to a specialist or family practice, etc.). Please return to urgent care if your symptoms change or worsen.     Discharge instructions:  -If you were prescribed a medication(s), please take this as prescribed/directed  -Monitor your symptoms, if changing/worsening, return to UC/ER or PCP for follow up    Animal Bite - you were seen in UC today for an animal bite.   -Your immunization record should have been reviewed (if applicable) and you were counseled on the importance of a tetanus vaccination if an update was needed.   -If an antibiotic was prescribed, please take this as directed and finish the course to completion.   -Wound care is important - depending on the extent of your wound, dressing changes will vary to antibiotic ointment/gauze and tape dressing to a more formal dressing.    - Switch to Bacitracin versus Neosporin  -If symptoms change or worsen such as fevers, chills, worsening discharge and/or other symptoms, please return to the ER/UC or contact your PCP for recommendations.   -For pain control (if needed), if you are able to take Ibuprofen and Tylenol, we recommend alternating these (see note below). Do not wear a patch over your eye (unless directed to  do so).    -Alternate every 4 hours as needed. I.e.: Ibuprofen at 8am, Tylenol 12pm, Ibuprofen 4pm    -Daily maximum of Tylenol is 4000mg (recommend staying under 3000mg)   -Daily maximum of Ibuprofen is 3200 mg

## 2022-09-06 NOTE — NURSING NOTE
Pt presents to clinic today for a work comp injury from 8/28/22 from a dog bite of the left lower leg.   Patient went to [facility] ER that same and was treated with antibiotics, states his leg is red and painful now, not any better. Has also been using neosporin on it with lidocaine.       FOOD SECURITY SCREENING QUESTIONS:    The next two questions are to help us understand your food security.  If you are feeling you need any assistance in this area, we have resources available to support you today.    Hunger Vital Signs:  Within the past 12 months we worried whether our food would run out before we got money to buy more. Never  Within the past 12 months the food we bought just didn't last and we didn't have money to get more. Never            Medication Reconciliation: Jaylene Goodman LPN,LPN on 9/6/2022 at 10:26 AM

## 2022-09-06 NOTE — PROGRESS NOTES
ASSESSMENT/PLAN:    I have reviewed the nursing notes.  I have reviewed the findings, diagnosis, plan and need for follow up with the patient.    1. Dog bite, initial encounter  - doxycycline hyclate (VIBRAMYCIN) 100 MG capsule; Take 1 capsule (100 mg) by mouth 2 times daily  Dispense: 20 capsule; Refill: 0  - metroNIDAZOLE (FLAGYL) 500 MG tablet; Take 1 tablet (500 mg) by mouth 3 times daily  Dispense: 30 tablet; Refill: 0  -Dog bites to left anterior shin with tenderness and mild erythema. No calor or drainage. Patient was on Amoxil via [facility]. Rabies was not addressed, discussed with patient appropriate treatment guidelines with Augmentin (not sole Amoxil) versus changing to dual therapy with Flagyl and Doxycycline - we opted for the later option. Side effects of Doxycycline and Flagyl were reviewed at length with patient.   -The dogs that bit patient have been monitored for over 10 days and no abnormal behavior noted, discussed uptodate guidelines in regards to rabies immunization as normal animal behavior, we do not need to institute therapy today.   -Wound care is important - depending on the extent of your wound, dressing changes will vary to antibiotic ointment/gauze and tape dressing to a more formal dressing.    - Switch to Bacitracin versus Neosporin  -If symptoms change or worsen such as fevers, chills, worsening discharge and/or other symptoms, please return to the ER/UC or contact your PCP for recommendations.   -For pain control (if needed), if you are able to take Ibuprofen and Tylenol, we recommend alternating these (see note below). Do not wear a patch over your eye (unless directed to do so).    -Alternate every 4 hours as needed. I.e.: Ibuprofen at 8am, Tylenol 12pm, Ibuprofen 4pm    -Daily maximum of Tylenol is 4000mg (recommend staying under 3000mg)   -Daily maximum of Ibuprofen is 3200 mg  -Patient is in agreement and understanding of the above treatment plan. All questions and  concerns were addressed and answered to patient's satisfaction. AVS reviewed with patient.     Discussed warning signs/symptoms indicative of need to f/u    Follow up if symptoms persist or worsen or concerns    I explained my diagnostic considerations and recommendations to the patient, who voiced understanding and agreement with the treatment plan. All questions were answered. We discussed potential side effects of any prescribed or recommended therapies, as well as expectations for response to treatments.    Lexi Bell PA-C  9/6/2022  10:44 AM    HPI:    Frankie Troncoso is a 42 year old male  who presents to [facility] today for concerns of work comp injury from 8/28/22 from a dog bite of the left lower leg. Patient went to [facility] ER that same and was treated with antibiotics, states his leg is red and painful now, not any better. Has also been using neosporin on it with lidocaine.     Pertinent History:  Current Symptoms: redness, pain and drainage  - Shoot pain from leg up to knee. Recurred 3 days ago.     He has used neosporin and kept site covered    Last Tetanus: updated on 8/28/22  Immunization Status of Animal Known: YES, not immunized    ROS: C: NEGATIVE for fever, chills, change in weight  I: NEGATIVE for worrisome rashes, moles or lesions  HEME/ALLERGY/IMMUNE:  No symptoms of anaphylaxis or significant allergic reaction    Allergies: up to date in chart    No past medical history on file.  No past surgical history on file.  Social History     Tobacco Use     Smoking status: Former Smoker     Packs/day: 2.00     Years: 10.00     Pack years: 20.00     Types: Cigarettes     Smokeless tobacco: Never Used   Substance Use Topics     Alcohol use: Yes     Comment: occasional     Current Outpatient Medications   Medication Sig Dispense Refill     albuterol (PROAIR HFA, PROVENTIL HFA, VENTOLIN HFA) 108 (90 BASE) MCG/ACT inhaler Inhale 2 puffs into the lungs every 6 hours as needed for shortness of breath  "/ dyspnea 1 Inhaler prn     albuterol (PROAIR HFA/PROVENTIL HFA/VENTOLIN HFA) 108 (90 Base) MCG/ACT inhaler Inhale 2 puffs into the lungs every 4 hours as needed for shortness of breath / dyspnea or wheezing 18 g 0     atorvastatin (LIPITOR) 20 MG tablet Take 20 mg by mouth daily       azithromycin (ZITHROMAX) 250 MG tablet Two tablets first day, then one tablet daily for four days. 6 tablet 0     CINNAMON PO Take 1 tablet by mouth daily        EPINEPHrine (ANY BX GENERIC EQUIV) 0.3 MG/0.3ML injection 2-pack Inject 0.3 mLs (0.3 mg) into the muscle as needed for anaphylaxis 0.6 mL 1     fexofenadine (ALLEGRA) 180 MG tablet Take 1 tablet (180 mg) by mouth daily 30 tablet 11     fish oil-omega-3 fatty acids 1000 MG capsule Take 1 capsule by mouth 2 times daily       insulin aspart (NOVOLOG FLEXPEN) 100 UNIT/ML pen Inject 8-14 Units Subcutaneous       insulin glargine (LANTUS) 100 UNIT/ML injection Inject 30 Units Subcutaneous At Bedtime        LISINOPRIL PO Take 2.5 mg by mouth daily       predniSONE (DELTASONE) 20 MG tablet Take 1 tablet (20 mg) by mouth in the morning and 1 tablet (20 mg) in the evening. 10 tablet 0     triamcinolone (KENALOG) 0.1 % external cream Apply topically 2 times daily 30 g 1     VITAMIN E Take 1 tablet by mouth daily        Allergies   Allergen Reactions     Other [No Clinical Screening - See Comments] Swelling     Pea pods- swelling of oral mucosa     Adhesive Tape      Codeine Nausea and Vomiting     Food [Peas]      Pea pods that are store bought, not grown at home     Nicotine      Allergic to the patch     Past medical history, past surgical history, current medications and allergies reviewed and accurate to the best of my knowledge.      ROS:  Refer to HPI    /88 (BP Location: Right arm, Patient Position: Sitting, Cuff Size: Adult Large)   Pulse 91   Temp 97.5  F (36.4  C) (Tympanic)   Resp 18   Ht 1.753 m (5' 9\")   Wt 108.5 kg (239 lb 4.8 oz)   SpO2 98%   BMI 35.34 kg/m  "     EXAM:  General Appearance: Well appearing 42 year old male, appropriate appearance for age. No acute distress   Respiratory: normal chest wall and respirations.  Normal effort.  Clear to auscultation bilaterally, no wheezing, crackles or rhonchi.  No increased work of breathing.  No cough appreciated.  Cardiac: RRR with no murmurs  MSK: normal foot and ankle range of motion, normal knee range of motion  Dermatological: x3 dog bite sites to left anterior mid shin measuring approximately 1 cm to 1 inch in size, surrounding erythema. No calor or drainage noted. No fluctuance.   Psychological: normal affect, alert, oriented, and pleasant.     Labs:  None     Xray:  None

## 2022-09-07 ENCOUNTER — TELEPHONE (OUTPATIENT)
Dept: FAMILY MEDICINE | Facility: OTHER | Age: 42
End: 2022-09-07

## 2022-09-07 NOTE — TELEPHONE ENCOUNTER
MARINA contacted the patient regarding Work Comp follow up. The patient stated he shouldn't need any further treatment unless it does not heal. Declined follow up visit.Maria Arriaga on 9/7/2022 at 4:29 PM

## 2023-01-02 ENCOUNTER — HOSPITAL ENCOUNTER (EMERGENCY)
Facility: HOSPITAL | Age: 43
Discharge: HOME | End: 2023-01-02
Attending: PHYSICIAN ASSISTANT | Admitting: PHYSICIAN ASSISTANT
Payer: COMMERCIAL

## 2023-01-02 VITALS
TEMPERATURE: 98.2 F | DIASTOLIC BLOOD PRESSURE: 97 MMHG | OXYGEN SATURATION: 97 % | HEART RATE: 96 BPM | RESPIRATION RATE: 18 BRPM | SYSTOLIC BLOOD PRESSURE: 138 MMHG

## 2023-01-02 DIAGNOSIS — Z76.0 ENCOUNTER FOR MEDICATION REFILL: ICD-10-CM

## 2023-01-02 PROCEDURE — G0463 HOSPITAL OUTPT CLINIC VISIT: HCPCS

## 2023-01-02 PROCEDURE — 99212 OFFICE O/P EST SF 10 MIN: CPT | Performed by: PHYSICIAN ASSISTANT

## 2023-01-02 RX ORDER — PROCHLORPERAZINE 25 MG/1
SUPPOSITORY RECTAL
Qty: 1 EACH | Refills: 3 | Status: SHIPPED | OUTPATIENT
Start: 2023-01-02

## 2023-01-02 NOTE — ED PROVIDER NOTES
History     Chief Complaint   Patient presents with     prescription for equipment     HPI  Frankie Troncoso is a 42 year old male who needs refill for his Dexcom G6 transmitter [facility] No concerns.     Allergies:  Allergies   Allergen Reactions     Other [No Clinical Screening - See Comments] Swelling     Pea pods- swelling of oral mucosa     Adhesive Tape      Codeine Nausea and Vomiting     Food [Peas]      Pea pods that are store bought, not grown at home     Nicotine      Allergic to the patch       Problem List:    Patient Active Problem List    Diagnosis Date Noted     Morbid obesity (H) 04/11/2022     Priority: Medium     Diabetes mellitus type 1 (H) 07/16/2014     Priority: Medium     Allergic rhinitis due to pollen 01/21/2014     Priority: Medium     Seasonal allergic rhinitis 12/03/2013     Priority: Medium     Sleep-disordered breathing 12/03/2013     Priority: Medium        Past Medical History:    No past medical history on file.    Past Surgical History:    No past surgical history on file.    Family History:    Family History   Problem Relation Age of Onset     Alcohol/Drug Father         alcoholism     C.A.D. Father      Cerebrovascular Disease Father         CVA     Allergies Father      Asthma Father      Heart Disease Other         heart disease - family h/o       Social History:  Marital Status:   [2]  Social History     Tobacco Use     Smoking status: Former     Packs/day: 2.00     Years: 10.00     Pack years: 20.00     Types: Cigarettes     Smokeless tobacco: Never   Vaping Use     Vaping Use: Never used   Substance Use Topics     Alcohol use: Yes     Comment: occasional     Drug use: No        Medications:    albuterol (PROAIR HFA, PROVENTIL HFA, VENTOLIN HFA) 108 (90 BASE) MCG/ACT inhaler  albuterol (PROAIR HFA/PROVENTIL HFA/VENTOLIN HFA) 108 (90 Base) MCG/ACT inhaler  Continuous Blood Gluc Transmit (DEXCOM G6 TRANSMITTER) MISC  atorvastatin  (LIPITOR) 20 MG tablet  azithromycin (ZITHROMAX) 250 MG tablet  CINNAMON PO  doxycycline hyclate (VIBRAMYCIN) 100 MG capsule  EPINEPHrine (ANY BX GENERIC EQUIV) 0.3 MG/0.3ML injection 2-pack  fexofenadine (ALLEGRA) 180 MG tablet  fish oil-omega-3 fatty acids 1000 MG capsule  insulin aspart (NOVOLOG FLEXPEN) 100 UNIT/ML pen  insulin glargine (LANTUS) 100 UNIT/ML injection  LISINOPRIL PO  metroNIDAZOLE (FLAGYL) 500 MG tablet  predniSONE (DELTASONE) 20 MG tablet  triamcinolone (KENALOG) 0.1 % external cream  VITAMIN E          Review of Systems   All other systems reviewed and are negative.      Physical Exam   BP: 138/97  Pulse: 96  Temp: 98.2  F (36.8  C)  Resp: 18  SpO2: 97 %      Physical Exam  Vitals and nursing note reviewed.   Constitutional:       Appearance: Normal appearance.   Cardiovascular:      Rate and Rhythm: Normal rate.   Pulmonary:      Effort: Pulmonary effort is normal.   Neurological:      Mental Status: He is alert.         ED Course                 Procedures         No results found for this or any previous visit (from the past 24 hour(s)).    Medications - No data to display    Assessments & Plan (with Medical Decision Making)   Pt here for medication refill, need Dexcom G6 glucose monitoring device ref[facility] to [facility], this was sent. Pt has no other concerns today. He was discharged home in good condition following.     I have reviewed the nursing notes.    I have reviewed the findings, diagnosis, plan and need for follow up with the patient.    New Prescriptions    CONTINUOUS BLOOD GLUC TRANSMIT (DEXCOM G6 TRANSMITTER) MISC    Change every 3 months.       Final diagnoses:   Encounter for medication refill       1/2/2023   [facility]

## 2023-01-09 ENCOUNTER — OFFICE VISIT (OUTPATIENT)
Dept: FAMILY MEDICINE | Facility: OTHER | Age: 43
End: 2023-01-09
Attending: FAMILY MEDICINE
Payer: COMMERCIAL

## 2023-01-09 VITALS
OXYGEN SATURATION: 97 % | WEIGHT: 246.8 LBS | RESPIRATION RATE: 20 BRPM | DIASTOLIC BLOOD PRESSURE: 74 MMHG | HEIGHT: 69 IN | HEART RATE: 107 BPM | BODY MASS INDEX: 36.56 KG/M2 | TEMPERATURE: 97.8 F | SYSTOLIC BLOOD PRESSURE: 128 MMHG

## 2023-01-09 DIAGNOSIS — E10.9 TYPE 1 DIABETES MELLITUS WITHOUT COMPLICATION (H): Primary | ICD-10-CM

## 2023-01-09 DIAGNOSIS — F51.5 NIGHTMARE DISORDER: ICD-10-CM

## 2023-01-09 LAB
CHOLEST SERPL-MCNC: 174 MG/DL
CREAT UR-MCNC: 67.2 MG/DL
HBA1C MFR BLD: 6.1 % (ref 4–6.2)
HDLC SERPL-MCNC: 56 MG/DL
LDLC SERPL CALC-MCNC: 89 MG/DL
MICROALBUMIN UR-MCNC: <12 MG/L
MICROALBUMIN/CREAT UR: NORMAL MG/G{CREAT}
NONHDLC SERPL-MCNC: 118 MG/DL
TRIGL SERPL-MCNC: 145 MG/DL

## 2023-01-09 PROCEDURE — 82570 ASSAY OF URINE CREATININE: CPT | Mod: ZL | Performed by: FAMILY MEDICINE

## 2023-01-09 PROCEDURE — 36415 COLL VENOUS BLD VENIPUNCTURE: CPT | Mod: ZL | Performed by: FAMILY MEDICINE

## 2023-01-09 PROCEDURE — 80061 LIPID PANEL: CPT | Mod: ZL | Performed by: FAMILY MEDICINE

## 2023-01-09 PROCEDURE — 99213 OFFICE O/P EST LOW 20 MIN: CPT | Performed by: FAMILY MEDICINE

## 2023-01-09 PROCEDURE — 83036 HEMOGLOBIN GLYCOSYLATED A1C: CPT | Mod: ZL | Performed by: FAMILY MEDICINE

## 2023-01-09 RX ORDER — PRAZOSIN HYDROCHLORIDE 2 MG/1
2 CAPSULE ORAL
COMMUNITY

## 2023-01-09 RX ORDER — LOSARTAN POTASSIUM 50 MG/1
25 TABLET ORAL DAILY
COMMUNITY

## 2023-01-09 RX ORDER — SIMVASTATIN 40 MG
40 TABLET ORAL AT BEDTIME
COMMUNITY
Start: 2023-01-09

## 2023-01-09 RX ORDER — INSULIN ASPART 100 [IU]/ML
INJECTION, SOLUTION INTRAVENOUS; SUBCUTANEOUS
COMMUNITY

## 2023-01-09 ASSESSMENT — ENCOUNTER SYMPTOMS
DIARRHEA: 0
EYE PAIN: 0
SHORTNESS OF BREATH: 0
JOINT SWELLING: 0
NAUSEA: 0
PARESTHESIAS: 0
WEAKNESS: 0
HEMATURIA: 0
FREQUENCY: 0
PALPITATIONS: 0
HEARTBURN: 1
COUGH: 0
DIZZINESS: 0
ARTHRALGIAS: 1
HEADACHES: 0
DYSURIA: 0
CHILLS: 0
FEVER: 0
ABDOMINAL PAIN: 0
CONSTIPATION: 0
NERVOUS/ANXIOUS: 0
MYALGIAS: 0
HEMATOCHEZIA: 0
SORE THROAT: 0

## 2023-01-09 ASSESSMENT — ANXIETY QUESTIONNAIRES
2. NOT BEING ABLE TO STOP OR CONTROL WORRYING: NOT AT ALL
8. IF YOU CHECKED OFF ANY PROBLEMS, HOW DIFFICULT HAVE THESE MADE IT FOR YOU TO DO YOUR WORK, TAKE CARE OF THINGS AT HOME, OR GET ALONG WITH OTHER PEOPLE?: NOT DIFFICULT AT ALL
5. BEING SO RESTLESS THAT IT IS HARD TO SIT STILL: SEVERAL DAYS
IF YOU CHECKED OFF ANY PROBLEMS ON THIS QUESTIONNAIRE, HOW DIFFICULT HAVE THESE PROBLEMS MADE IT FOR YOU TO DO YOUR WORK, TAKE CARE OF THINGS AT HOME, OR GET ALONG WITH OTHER PEOPLE: NOT DIFFICULT AT ALL
6. BECOMING EASILY ANNOYED OR IRRITABLE: SEVERAL DAYS
GAD7 TOTAL SCORE: 4
GAD7 TOTAL SCORE: 4
4. TROUBLE RELAXING: MORE THAN HALF THE DAYS
7. FEELING AFRAID AS IF SOMETHING AWFUL MIGHT HAPPEN: NOT AT ALL
7. FEELING AFRAID AS IF SOMETHING AWFUL MIGHT HAPPEN: NOT AT ALL
1. FEELING NERVOUS, ANXIOUS, OR ON EDGE: NOT AT ALL
3. WORRYING TOO MUCH ABOUT DIFFERENT THINGS: NOT AT ALL

## 2023-01-09 ASSESSMENT — PAIN SCALES - GENERAL: PAINLEVEL: SEVERE PAIN (6)

## 2023-01-09 NOTE — PROGRESS NOTES
"  SUBJECTIVE:   Frankie Troncoso is a 42 year old male who presents to clinic today for the following health issues: Diabetic check    [facility]  But is interested in establishing care here and getting diabetic check.  He has gone [facility].  I do not have any results available.  He has type 1 diabetes.  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  Physical Exam  Constitutional:       Appearance: Normal appearance.   Neurological:      Mental Status: He is alert.         Diagnostic Test Results:  Results for orders placed or performed in visit on 01/09/23   Lipid Panel     Status: Normal   Result Value Ref Range    Cholesterol 174 <200 mg/dL    Triglycerides 145 <150 mg/dL    Direct Measure HDL 56 >=40 mg/dL    LDL Cholesterol Calculated 89 <=100 mg/dL    Non HDL Cholesterol 118 <130 mg/dL    " Narrative    Cholesterol  Desirable:  <200 mg/dL    Triglycerides  Normal:  Less than 150 mg/dL  Borderline High:  150-199 mg/dL  High:  200-499 mg/dL  Very High:  Greater than or equal to 500 mg/dL    Direct Measure HDL  Female:  Greater than or equal to 50 mg/dL   Male:  Greater than or equal to 40 mg/dL    LDL Cholesterol  Desirable:  <100mg/dL  Above Desirable:  100-129 mg/dL   Borderline High:  130-159 mg/dL   High:  160-189 mg/dL   Very High:  >= 190 mg/dL    Non HDL Cholesterol  Desirable:  130 mg/dL  Above Desirable:  130-159 mg/dL  Borderline High:  160-189 mg/dL  High:  190-219 mg/dL  Very High:  Greater than or equal to 220 mg/dL   Hemoglobin A1c     Status: Normal   Result Value Ref Range    Hemoglobin A1C 6.1 4.0 - 6.2 %   Albumin Random Urine Quantitative with Creat Ratio     Status: None   Result Value Ref Range    Albumin Urine mg/L <12.0 mg/L    Albumin Urine mg/g Cr      Creatinine Urine mg/dL 67.2 mg/dL       ASSESSMENT/PLAN:           ICD-10-CM    1. Type 1 diabetes mellitus without complication (H)  E10.9 Hemoglobin A1c     Albumin Random Urine Quantitative with Creat Ratio     Lipid Panel     Lipid Panel     Hemoglobin A1c     Albumin Random Urine Quantitative with Creat Ratio      2. 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  Diabetes under good control.      Wilmer Ellsworth MD  [facility]  Answers for HPI/ROS submitted by the patient on 1/9/2023  LUCILA 7 TOTAL SCORE: 4  Frequency of exercise:: None  Getting at least 3 servings of Calcium per day:: NO  Diet:: Diabetic, Carbohydrate counting  Taking medications regularly:: Yes  Medication side effects:: Not applicable  Bi-annual eye exam:: Yes  Dental care twice a year:: NO  Sleep apnea or symptoms of sleep apnea:: None  abdominal pain: No  Blood in stool: No  Blood in urine: No  chest pain: No  chills: No  congestion: No  constipation: No  cough: No  diarrhea: No  dizziness: No  ear pain: No  eye pain:  No  nervous/anxious: No  fever: No  frequency: No  genital sores: No  headaches: No  hearing loss: Yes  heartburn: Yes  arthralgias: Yes  joint swelling: No  peripheral edema: No  mood changes: No  myalgias: No  nausea: No  dysuria: No  palpitations: No  Skin sensation changes: No  sore throat: No  urgency: No  rash: No  shortness of breath: No  visual disturbance: No  weakness: No  impotence: No  penile discharge: No  Additional concerns today:: Yes

## 2023-01-09 NOTE — NURSING NOTE
Patient here for physical, diabetes and to establish care. He currently has 3 abscess teeth he will be having removed this month. Last eye exam 2022 and dental exam 17th of this month.  Medication Reconciliation: complete.    Brittny Gutierrez LPN  1/9/2023 8:19 AM  
today

## 2023-03-27 ENCOUNTER — APPOINTMENT (OUTPATIENT)
Dept: CHIROPRACTIC MEDICINE | Facility: OTHER | Age: 43
End: 2023-03-27

## 2023-03-27 ENCOUNTER — APPOINTMENT (OUTPATIENT)
Dept: OCCUPATIONAL MEDICINE | Facility: OTHER | Age: 43
End: 2023-03-27

## 2023-03-31 ENCOUNTER — APPOINTMENT (OUTPATIENT)
Dept: OCCUPATIONAL MEDICINE | Facility: OTHER | Age: 43
End: 2023-03-31

## 2023-03-31 PROCEDURE — 86580 TB INTRADERMAL TEST: CPT

## 2023-04-29 ENCOUNTER — HEALTH MAINTENANCE LETTER (OUTPATIENT)
Age: 43
End: 2023-04-29

## 2023-06-03 ENCOUNTER — HEALTH MAINTENANCE LETTER (OUTPATIENT)
Age: 43
End: 2023-06-03

## 2023-08-07 ENCOUNTER — PATIENT OUTREACH (OUTPATIENT)
Dept: FAMILY MEDICINE | Facility: OTHER | Age: 43
End: 2023-08-07
Payer: COMMERCIAL

## 2023-08-08 ENCOUNTER — PATIENT OUTREACH (OUTPATIENT)
Dept: FAMILY MEDICINE | Facility: OTHER | Age: 43
End: 2023-08-08
Payer: COMMERCIAL

## 2023-08-08 NOTE — TELEPHONE ENCOUNTER
Patient Quality Outreach    Patient is due for the following:   Diabetes -  Eye Exam and Diabetic Follow-Up Visit    Next Steps:   Schedule a office visit for Diabetes    Type of outreach:    Sent letter.                 Marlin Webb

## 2023-08-08 NOTE — LETTER
[facility]  [address]  [place] [id number] [phone]       August 8, 2023    Frankie Troncoso  [address] [facility] [address]    Dear Yung,    We care about your health and have reviewed your health plan and are making recommendations based on this review, to optimize your health.    You are in particular need of attention regarding:  -Diabetes    We are recommending that you:  -schedule a FOLLOWUP OFFICE APPOINTMENT with me.      In addition, here is a list of due or overdue Health Maintenance reminders.    Health Maintenance Due   Topic Date Due    Yearly Preventive Visit  Never done    Diabetic Foot Exam  Never done    Discuss Advance Care Planning  Never done    Eye Exam  Never done    Pneumococcal Vaccine (1 - PCV) Never done    Hepatitis B Vaccine (2 of 3 - 19+ 3-dose series) 12/16/2009    COVID-19 Vaccine (3 - Pfizer series) 03/24/2021    A1C Lab  04/09/2023    Basic Metabolic Panel  04/11/2023       To address the above recommendations, we encourage you to contact us at [phone], They will assist you with finding the most convenient time and location.    Thank you for trusting [facility] and we appreciate the opportunity to serve you.  We look forward to supporting your healthcare needs in the future.    Healthy Regards,    Your [facility] Team

## 2023-10-01 ENCOUNTER — HEALTH MAINTENANCE LETTER (OUTPATIENT)
Age: 43
End: 2023-10-01

## 2023-10-03 ENCOUNTER — APPOINTMENT (OUTPATIENT)
Dept: OCCUPATIONAL MEDICINE | Facility: OTHER | Age: 43
End: 2023-10-03

## 2023-10-03 ENCOUNTER — OFFICE VISIT (OUTPATIENT)
Dept: FAMILY MEDICINE | Facility: OTHER | Age: 43
End: 2023-10-03

## 2023-10-03 DIAGNOSIS — Z02.1 PRE-EMPLOYMENT EXAMINATION: Primary | ICD-10-CM

## 2024-02-17 ENCOUNTER — HEALTH MAINTENANCE LETTER (OUTPATIENT)
Age: 44
End: 2024-02-17

## 2024-05-07 ENCOUNTER — APPOINTMENT (OUTPATIENT)
Dept: GENERAL RADIOLOGY | Facility: HOSPITAL | Age: 44
End: 2024-05-07
Attending: NURSE PRACTITIONER
Payer: COMMERCIAL

## 2024-05-07 ENCOUNTER — HOSPITAL ENCOUNTER (EMERGENCY)
Facility: HOSPITAL | Age: 44
Discharge: HOME | End: 2024-05-07
Attending: NURSE PRACTITIONER | Admitting: NURSE PRACTITIONER
Payer: COMMERCIAL

## 2024-05-07 VITALS
HEART RATE: 92 BPM | TEMPERATURE: 98.6 F | OXYGEN SATURATION: 97 % | HEIGHT: 69 IN | RESPIRATION RATE: 15 BRPM | WEIGHT: 266.6 LBS | SYSTOLIC BLOOD PRESSURE: 150 MMHG | DIASTOLIC BLOOD PRESSURE: 99 MMHG | BODY MASS INDEX: 39.49 KG/M2

## 2024-05-07 DIAGNOSIS — M25.571 PAIN IN JOINT, ANKLE AND FOOT, RIGHT: Primary | ICD-10-CM

## 2024-05-07 DIAGNOSIS — M72.2 PLANTAR FASCIITIS: ICD-10-CM

## 2024-05-07 PROCEDURE — 73630 X-RAY EXAM OF FOOT: CPT | Mod: RT

## 2024-05-07 PROCEDURE — 99213 OFFICE O/P EST LOW 20 MIN: CPT | Performed by: NURSE PRACTITIONER

## 2024-05-07 PROCEDURE — 73610 X-RAY EXAM OF ANKLE: CPT | Mod: RT

## 2024-05-07 PROCEDURE — G0463 HOSPITAL OUTPT CLINIC VISIT: HCPCS

## 2024-05-07 ASSESSMENT — ENCOUNTER SYMPTOMS
WOUND: 1
FEVER: 0
CHILLS: 0
COLOR CHANGE: 0
ARTHRALGIAS: 1
JOINT SWELLING: 1

## 2024-05-07 ASSESSMENT — ACTIVITIES OF DAILY LIVING (ADL)
ADLS_ACUITY_SCORE: 35
ADLS_ACUITY_SCORE: 35

## 2024-05-07 NOTE — ED TRIAGE NOTES
Pt presents with c/o right ankle pain. Pain started April 1st. [facility] and was treated for an infection. Pt reports he has bruising and swelling around ankle. Unsure what caused pain to worsen. Unsure when the new pain started. Pain has been worsening. CMS intact. Limited Rom due to pain. Pt ambulated to room with limp. No otc meds.

## 2024-05-07 NOTE — DISCHARGE INSTRUCTIONS
Your x-rays do not show anything that is fractured or dislocated.  You do have some soft tissue swelling was noted to the bottom of your foot, no obvious foreign body that was appreciated.    As discussed your symptoms are suspicious for an plantar fasciitis to the bottom of your foot.  You can ice your ankle and your foot for 20 minutes at a time.  Take 600-800mg of ibuprofen every 6-8hrs as needed for pain.      Schedule an appointment with your primary doctor or foot doctor for follow-up.

## 2024-05-07 NOTE — ED PROVIDER NOTES
History     Chief Complaint   Patient presents with    Ankle Pain     Right ankle pain     HPI  Frankie Troncoso is a 44 year old male who presents ambulatory to urgent care for evaluation of right ankle pain.  A little over 1 month ago the patient had an abrasion to his right ankle that became infected.  Patient tells me that he had an infection to his whole foot and ankle.  [facility].  The swelling and then the infection resolved.  Patient tells me that he continues to have pain mostly to the medial aspect of his ankle along with some bruising.  Unsure if he may have injured his ankle.  Also notes that he has pain to the bottom of his foot mostly when he applies pressure on his foot.  No history of surgeries to this foot or ankle.  He is a type I diabetic.    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 No pertinent past medical history.    Past Surgical History:    History reviewed. No pertinent surgical history.    Family History:    Family History   Problem Relation Age of Onset    Alcohol/Drug Father         alcoholism    C.A.D. Father     Cerebrovascular Disease Father         CVA    Allergies Father     Asthma Father      "Heart Disease Other         heart disease - family h/o       Social History:  Marital Status:   [2]  Social History     Tobacco Use    Smoking status: Former     Current packs/day: 2.00     Average packs/day: 2.0 packs/day for 10.0 years (20.0 ttl pk-yrs)     Types: Cigarettes    Smokeless tobacco: Never   Vaping Use    Vaping status: Never Used   Substance Use Topics    Alcohol use: Yes     Comment: 6 a week if off    Drug use: No        Medications:    albuterol (PROAIR HFA, PROVENTIL HFA, VENTOLIN HFA) 108 (90 BASE) MCG/ACT inhaler  CINNAMON PO  Continuous Blood Gluc Transmit (DEXCOM G6 TRANSMITTER) MISC  EPINEPHrine (ANY BX GENERIC EQUIV) 0.3 MG/0.3ML injection 2-pack  fish oil-omega-3 fatty acids 1000 MG capsule  insulin aspart (NOVOLOG VIAL) 100 UNITS/ML vial  INSULIN PUMP - OUTPATIENT  losartan (COZAAR) 50 MG tablet  omeprazole (PRILOSEC) 20 MG DR capsule  prazosin (MINIPRESS) 2 MG capsule  simvastatin (ZOCOR) 40 MG tablet          Review of Systems   Constitutional:  Negative for chills and fever.   Musculoskeletal:  Positive for arthralgias, gait problem and joint swelling.   Skin:  Positive for wound. Negative for color change.   All other systems reviewed and are negative.      Physical Exam   BP: 150/99  Pulse: 92  Temp: 98.6  F (37  C)  Resp: 15  Height: 175.3 cm (5' 9\")  Weight: 120.9 kg (266 lb 9.6 oz)  SpO2: 97 %      Physical Exam  Vitals and nursing note reviewed.   Constitutional:       General: He is not in acute distress.     Appearance: Normal appearance. He is well-developed. He is not diaphoretic.   HENT:      Head: Normocephalic and atraumatic.   Eyes:      Pupils: Pupils are equal, round, and reactive to light.   Cardiovascular:      Rate and Rhythm: Normal rate.      Pulses:           Dorsalis pedis pulses are 2+ on the right side.   Pulmonary:      Effort: Pulmonary effort is normal.   Musculoskeletal:         General: No deformity or signs of injury.      Cervical back: Normal " range of motion and neck supple.      Right lower leg: No edema.      Left lower leg: No edema.      Right foot: Normal range of motion. No deformity or foot drop.        Feet:       Comments: Medial right ankle tenderness to palpation.  Mild bruising noted to this area.  No tenderness over the Achilles.  Some mild swelling noted to dorsal right foot.  No erythema.  Healed wound noted due to distal lateral right lower leg.   Feet:      Right foot:      Skin integrity: No ulcer, skin breakdown or erythema.      Comments: Tenderness to plantar right foot.  Mild swelling appreciated.  No erythema or bruising to this area.  No obvious puncture wounds wounds appreciated.  Skin:     General: Skin is warm and dry.      Coloration: Skin is not pale.      Findings: Bruising present. No erythema.   Neurological:      Mental Status: He is alert and oriented to person, place, and time.         ED Course        Procedures         Results for orders placed or performed during the hospital encounter of 05/07/24 (from the past 24 hour(s))   Foot  XR, G/E 3 views, right    Narrative    Exam: XR FOOT RIGHT G/E 3 VIEWS     History:Male, age 44 years, pain to the bottom of foot and mild dorsal  swelling; no known injury; previously treated for cellulitis to foot  and ankle    Comparison:  No relevant prior imaging.    Technique: Three views are submitted.    Findings: Bones are normally mineralized. No evidence of acute or  subacute fracture.  No evidence of dislocation.           Impression    Impression:  No evidence of acute or subacute bony abnormality.     Plantar soft tissue swelling without evidence of apparent foreign  body. No gas within the soft tissues.    Moderate size plantar calcaneal spur. And small enthesophyte at the  insertion of the Achilles tendon.    ZOE MERAZ MD         SYSTEM ID:  [id number]   Ankle XR, G/E 3 views, right    Narrative    Exam: XR ANKLE RIGHT G/E 3 VIEWS     History:Male, age 44 years, medial  ankle pain x 1 month; no known  injury, hurts to walk; previously treated for cellulitis to foot and  ankle    Comparison:  No relevant prior imaging.    Technique: Three views are submitted.    Findings: Bones are normally mineralized. No evidence of acute or  subacute fracture.  No evidence of dislocation.           Impression    Impression:  No evidence of acute or subacute bony abnormality.    ZOE MERAZ MD         SYSTEM ID:  [id number]       Medications - No data to display    Assessments & Plan (with Medical Decision Making)   44-year-old male that presented for evaluation of continued right ankle and plantar right foot pain that started little over a month ago.  Patient previously treated for what sounds like was cellulitis to the right ankle and foot from an abrasion to his distal right leg.  No erythema was appreciated to his right foot or ankle today.  His pulses are intact.  No obvious deformity appreciated.  X-rays of the right ankle are negative for acute findings.  X-rays of the right foot do show the soft tissue swelling to the plantar right foot with no evidence of an obvious foreign object.    I discussed findings with patient.  He does have some findings that are suspicious for planta fasciitis to the plantar right foot.  An Ace wrap was applied to his right foot and ankle.  Recommended applying ice packs to the ankle and foot for 20 minutes at a time.  Patient notes he has not been taking anything for pain.  Declines any narcotic pain medications which is reasonable.  He is a diabetic and we will therefore avoid corticosteroids at this time.  Will treat with ibuprofen 600 - 800mg every 6-8 hours as needed for pain.  [facility]  Return to urgent care or emergency room if any worsening or concerning symptoms.    I have reviewed the nursing notes.    I have reviewed the findings, diagnosis, plan and need for follow up with the  patient.  This document was prepared using a combination of typing and voice generated software.  While every attempt was made for accuracy, spelling and grammatical errors may exist.         New Prescriptions    No medications on file       Final diagnoses:   Pain in joint, ankle and foot, right   Plantar fasciitis       5/7/2024   [facility]       Mpofu, Audra, CNP  05/07/24 [id number]

## 2024-07-06 ENCOUNTER — HEALTH MAINTENANCE LETTER (OUTPATIENT)
Age: 44
End: 2024-07-06

## 2024-11-23 ENCOUNTER — HEALTH MAINTENANCE LETTER (OUTPATIENT)
Age: 44
End: 2024-11-23

## 2025-02-17 ENCOUNTER — HOSPITAL ENCOUNTER (EMERGENCY)
Facility: HOSPITAL | Age: 45
Discharge: HOME | End: 2025-02-17
Attending: PHYSICIAN ASSISTANT
Payer: COMMERCIAL

## 2025-02-17 VITALS
SYSTOLIC BLOOD PRESSURE: 152 MMHG | OXYGEN SATURATION: 95 % | DIASTOLIC BLOOD PRESSURE: 92 MMHG | HEART RATE: 109 BPM | TEMPERATURE: 101.3 F | RESPIRATION RATE: 16 BRPM

## 2025-02-17 DIAGNOSIS — R51.9 HEADACHE: ICD-10-CM

## 2025-02-17 DIAGNOSIS — R50.9 FEVER: ICD-10-CM

## 2025-02-17 DIAGNOSIS — R51.9 FACIAL PAIN: ICD-10-CM

## 2025-02-17 LAB
FLUAV RNA SPEC QL NAA+PROBE: NEGATIVE
FLUBV RNA RESP QL NAA+PROBE: NEGATIVE
RSV RNA SPEC NAA+PROBE: NEGATIVE
SARS-COV-2 RNA RESP QL NAA+PROBE: NEGATIVE

## 2025-02-17 PROCEDURE — 99284 EMERGENCY DEPT VISIT MOD MDM: CPT

## 2025-02-17 PROCEDURE — 99283 EMERGENCY DEPT VISIT LOW MDM: CPT | Performed by: PHYSICIAN ASSISTANT

## 2025-02-17 PROCEDURE — 87637 SARSCOV2&INF A&B&RSV AMP PRB: CPT | Performed by: PHYSICIAN ASSISTANT

## 2025-02-17 PROCEDURE — 87637 SARSCOV2&INF A&B&RSV AMP PRB: CPT | Performed by: STUDENT IN AN ORGANIZED HEALTH CARE EDUCATION/TRAINING PROGRAM

## 2025-02-17 RX ORDER — OXYMETAZOLINE HYDROCHLORIDE 0.05 G/100ML
2 SPRAY NASAL 2 TIMES DAILY
Qty: 1 ML | Refills: 0 | Status: SHIPPED | OUTPATIENT
Start: 2025-02-17 | End: 2025-02-20

## 2025-02-17 RX ORDER — ONDANSETRON 4 MG/1
4 TABLET, ORALLY DISINTEGRATING ORAL EVERY 8 HOURS PRN
Qty: 10 TABLET | Refills: 0 | Status: SHIPPED | OUTPATIENT
Start: 2025-02-17 | End: 2025-02-20

## 2025-02-17 ASSESSMENT — COLUMBIA-SUICIDE SEVERITY RATING SCALE - C-SSRS
1. IN THE PAST MONTH, HAVE YOU WISHED YOU WERE DEAD OR WISHED YOU COULD GO TO SLEEP AND NOT WAKE UP?: NO
6. HAVE YOU EVER DONE ANYTHING, STARTED TO DO ANYTHING, OR PREPARED TO DO ANYTHING TO END YOUR LIFE?: NO
2. HAVE YOU ACTUALLY HAD ANY THOUGHTS OF KILLING YOURSELF IN THE PAST MONTH?: NO

## 2025-02-17 ASSESSMENT — ENCOUNTER SYMPTOMS
FEVER: 1
SINUS PAIN: 1
WEAKNESS: 1
SINUS PRESSURE: 1
CHILLS: 1
FATIGUE: 1
COUGH: 0
NAUSEA: 1
ABDOMINAL PAIN: 0
SHORTNESS OF BREATH: 0
HEADACHES: 1
ACTIVITY CHANGE: 1
VOMITING: 0

## 2025-02-17 ASSESSMENT — ACTIVITIES OF DAILY LIVING (ADL): ADLS_ACUITY_SCORE: 41

## 2025-02-17 NOTE — DISCHARGE INSTRUCTIONS
As we discussed, it is very important that you return to this emergency department for any changes or worsening of your condition whatsoever.  You have elected to decline further workup in the emergency department today.  Please start the antibiotics as prescribed.  It is important to take ibuprofen and Tylenol as needed for pain and discomfort as well as fevers.  It is also important to start over-the-counter Afrin nasal spray for help with your sinusitis.

## 2025-02-17 NOTE — ED TRIAGE NOTES
Pt presents with headaches for the past week.  Has been having fevers also. Denies cough. Lots of sinus pressure.

## 2025-02-17 NOTE — ED PROVIDER NOTES
History     Chief Complaint   Patient presents with    Fever    Headache    Generalized Body Aches    Sinusitis     The history is provided by the patient.     Frankie Troncoso is a 44 year old male who presented to the emergency department ambulatory for evaluation of a few days of fever, frontal headache, body aches, and concerns of a sinus infection.  Denies any vomiting.  Denies any cough or chest pain.  Denies any shortness of breath.  Denies any unusual rashes.  He reports pain over his nasal bridge as well as frontal sinuses.  Past medical history most significant for type 1 diabetes currently on an insulin pump.    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    Past Surgical History:    No past surgical history on file.    Family History:    Family History   Problem Relation Age of Onset    Alcohol/Drug Father         alcoholism    C.A.D. Father     Cerebrovascular Disease Father         CVA    Allergies Father     Asthma Father     Heart Disease Other         heart disease - family h/o       Social History:  Marital Status:   [2]  Social History     Tobacco Use    Smoking status: Former     Current packs/day: 2.00     Average packs/day: 2.0 packs/day for  10.0 years (20.0 ttl pk-yrs)     Types: Cigarettes    Smokeless tobacco: Never   Vaping Use    Vaping status: Never Used   Substance Use Topics    Alcohol use: Yes     Comment: 6 a week if off    Drug use: No        Medications:    amoxicillin-clavulanate (AUGMENTIN) 875-125 MG tablet  ondansetron (ZOFRAN ODT) 4 MG ODT tab  oxymetazoline (AFRIN NASAL SPRAY) 0.05 % nasal spray  albuterol (PROAIR HFA, PROVENTIL HFA, VENTOLIN HFA) 108 (90 BASE) MCG/ACT inhaler  CINNAMON PO  Continuous Blood Gluc Transmit (DEXCOM G6 TRANSMITTER) MISC  EPINEPHrine (ANY BX GENERIC EQUIV) 0.3 MG/0.3ML injection 2-pack  fish oil-omega-3 fatty acids 1000 MG capsule  insulin aspart (NOVOLOG VIAL) 100 UNITS/ML vial  INSULIN PUMP - OUTPATIENT  losartan (COZAAR) 50 MG tablet  omeprazole (PRILOSEC) 20 MG DR capsule  prazosin (MINIPRESS) 2 MG capsule  simvastatin (ZOCOR) 40 MG tablet          Review of Systems   Constitutional:  Positive for activity change, chills, fatigue and fever.   HENT:  Positive for congestion, sinus pressure and sinus pain.    Respiratory:  Negative for cough and shortness of breath.    Cardiovascular:  Negative for chest pain.   Gastrointestinal:  Positive for nausea. Negative for abdominal pain and vomiting.   Genitourinary: Negative.    Neurological:  Positive for weakness and headaches.       Physical Exam   BP: (!) 152/92  Pulse: 109  Temp: 101.3  F (38.5  C)  Resp: 16  SpO2: 95 %      Physical Exam  Vitals and nursing note reviewed.   Constitutional:       General: He is not in acute distress.     Appearance: Normal appearance. He is obese. He is not ill-appearing, toxic-appearing or diaphoretic.      Comments: Pleasant and talkative 44-year-old male found seated upright on the exam bed in no distress   HENT:      Head: Normocephalic and atraumatic.      Comments: He complains of significant discomfort upon percussion of the frontal sinuses     Right Ear: Tympanic membrane, ear canal and external ear normal.       Left Ear: Tympanic membrane, ear canal and external ear normal.      Mouth/Throat:      Mouth: Mucous membranes are moist.      Pharynx: Oropharynx is clear. No oropharyngeal exudate or posterior oropharyngeal erythema.   Eyes:      Extraocular Movements: Extraocular movements intact.      Conjunctiva/sclera: Conjunctivae normal.      Pupils: Pupils are equal, round, and reactive to light.   Cardiovascular:      Rate and Rhythm: Regular rhythm. Tachycardia present.   Pulmonary:      Effort: Pulmonary effort is normal.      Breath sounds: Normal breath sounds.   Abdominal:      General: There is no distension.      Palpations: Abdomen is soft.      Tenderness: There is no abdominal tenderness.   Musculoskeletal:      Cervical back: Normal range of motion and neck supple.   Skin:     General: Skin is warm and dry.      Capillary Refill: Capillary refill takes less than 2 seconds.   Neurological:      General: No focal deficit present.      Mental Status: He is alert and oriented to person, place, and time.   Psychiatric:         Mood and Affect: Mood normal.         ED Course        Procedures              Critical Care time:  none     None         Results for orders placed or performed during the hospital encounter of 02/17/25 (from the past 24 hours)   Influenza A/B, RSV and SARS-CoV2 PCR (COVID-19) Nasopharyngeal    Specimen: Nasopharyngeal; Swab   Result Value Ref Range    Influenza A PCR Negative Negative    Influenza B PCR Negative Negative    RSV PCR Negative Negative    SARS CoV2 PCR Negative Negative    Narrative    Testing was performed using the Xpert Xpress CoV2/Flu/RSV Assay on the [employer] GeneXpert Instrument. This test should be ordered for the detection of SARS-CoV2, influenza, and RSV viruses in individuals with signs and symptoms of respiratory tract infection. This test is for in vitro diagnostic use under the [place] FDA for laboratories certified under CLIA to perform high or moderate complexity  testing. This test has been [place] FDA cleared. A negative result does not rule out the presence of PCR inhibitors in the specimen or target RNA in concentration below the limit of detection for the assay. If only one viral target is positive but coinfection with multiple targets is suspected, the sample should be re-tested with another FDA cleared, approved, or authorized test, if coninfection would change clinical management. This test was validated by the [facility]. These laboratories are certified under the Clinical Laboratory Improvement Amendments of 1988 (CLIA-88) as qualified to perfom high complexity laboratory testing.       Medications - No data to display    Assessments & Plan (with Medical Decision Making)   44-year-old male with several days of intermittent fevers, sinus pressure, nasal pressure, headache, and bodyaches.  Broad differential considered to include any number of infectious etiologies.  Symptoms are consistent with frontal sinusitis and likely underlying nonspecific viral illness.  However, obviously meningitis, encephalitis, sepsis, as well as other underlying concerning infectious etiologies in a type I diabetic are on the differential.  Discussed further evaluation emergency department given the negative viral studies to include lab evaluation and other possible imaging to rule out other possible sinister etiologies.  The patient however refused.  Plan will be for outpatient Augmentin as well as Afrin nasal spray and Zofran for likely underlying frontal sinusitis.  However, very strict return precautions were provided that include returning to this emergency department for any worsening of his condition or new complaints whatsoever.    This document was prepared using a combination of typing and voice generated software.  While every attempt was made for accuracy, spelling and grammatical errors may exist.     I have reviewed the nursing notes.    I have reviewed the  findings, diagnosis, plan and need for follow up with the patient.           Medical Decision Making  The patient's presentation was of moderate complexity (an acute illness with systemic symptoms).    The patient's evaluation involved:  strong consideration of a test (multiple labs, blood cultures, possible CT scan of the head and LP) that was ultimately deferred  ordering and/or review of 1 test(s) in this encounter (viral studies)    The patient's management necessitated moderate risk (prescription drug management including medications given in the ED).        New Prescriptions    AMOXICILLIN-CLAVULANATE (AUGMENTIN) 875-125 MG TABLET    Take 1 tablet by mouth 2 times daily for 7 days.    ONDANSETRON (ZOFRAN ODT) 4 MG ODT TAB    Take 1 tablet (4 mg) by mouth every 8 hours as needed.    OXYMETAZOLINE (AFRIN NASAL SPRAY) 0.05 % NASAL SPRAY    Spray 0.2 mLs (2 sprays) in nostril 2 times daily for 3 days.       Final diagnoses:   Fever   Headache   Facial pain       2/17/2025   [facility]       Quirino Solano PA-C  02/17/25 [id number]

## 2025-02-27 ENCOUNTER — APPOINTMENT (OUTPATIENT)
Dept: ULTRASOUND IMAGING | Facility: HOSPITAL | Age: 45
End: 2025-02-27
Attending: EMERGENCY MEDICINE
Payer: COMMERCIAL

## 2025-02-27 ENCOUNTER — HOSPITAL ENCOUNTER (EMERGENCY)
Facility: HOSPITAL | Age: 45
Discharge: HOME | End: 2025-02-27
Attending: EMERGENCY MEDICINE
Payer: COMMERCIAL

## 2025-02-27 ENCOUNTER — APPOINTMENT (OUTPATIENT)
Dept: CT IMAGING | Facility: HOSPITAL | Age: 45
End: 2025-02-27
Attending: EMERGENCY MEDICINE
Payer: COMMERCIAL

## 2025-02-27 VITALS
OXYGEN SATURATION: 96 % | HEART RATE: 104 BPM | BODY MASS INDEX: 38.21 KG/M2 | TEMPERATURE: 99.3 F | SYSTOLIC BLOOD PRESSURE: 158 MMHG | WEIGHT: 258 LBS | RESPIRATION RATE: 18 BRPM | DIASTOLIC BLOOD PRESSURE: 98 MMHG | HEIGHT: 69 IN

## 2025-02-27 DIAGNOSIS — R17 JAUNDICE: ICD-10-CM

## 2025-02-27 DIAGNOSIS — B17.9 ACUTE HEPATITIS: ICD-10-CM

## 2025-02-27 LAB
ALBUMIN SERPL BCG-MCNC: 3.2 G/DL (ref 3.5–5.2)
ALBUMIN UR-MCNC: NEGATIVE MG/DL
ALP SERPL-CCNC: 473 U/L (ref 40–150)
ALT SERPL W P-5'-P-CCNC: 603 U/L (ref 0–70)
AMMONIA PLAS-SCNC: 21 UMOL/L (ref 16–60)
ANION GAP SERPL CALCULATED.3IONS-SCNC: 13 MMOL/L (ref 7–15)
APAP SERPL-MCNC: <5 UG/ML (ref 10–30)
APPEARANCE UR: CLEAR
AST SERPL W P-5'-P-CCNC: 504 U/L (ref 0–45)
BASOPHILS # BLD MANUAL: 0 10E3/UL (ref 0–0.2)
BASOPHILS NFR BLD MANUAL: 0 %
BILIRUB DIRECT SERPL-MCNC: 3.14 MG/DL (ref 0–0.3)
BILIRUB SERPL-MCNC: 4.3 MG/DL
BILIRUB UR QL STRIP: NEGATIVE
BUN SERPL-MCNC: 9.8 MG/DL (ref 6–20)
CALCIUM SERPL-MCNC: 9.5 MG/DL (ref 8.8–10.4)
CHLORIDE SERPL-SCNC: 99 MMOL/L (ref 98–107)
COLOR UR AUTO: YELLOW
CREAT SERPL-MCNC: 0.87 MG/DL (ref 0.67–1.17)
CRP SERPL-MCNC: 31.77 MG/L
EGFRCR SERPLBLD CKD-EPI 2021: >90 ML/MIN/1.73M2
EOSINOPHIL # BLD MANUAL: 0.1 10E3/UL (ref 0–0.7)
EOSINOPHIL NFR BLD MANUAL: 1 %
ERYTHROCYTE [DISTWIDTH] IN BLOOD BY AUTOMATED COUNT: 16.1 % (ref 10–15)
FLUAV RNA SPEC QL NAA+PROBE: NEGATIVE
FLUBV RNA RESP QL NAA+PROBE: NEGATIVE
GLUCOSE SERPL-MCNC: 121 MG/DL (ref 70–99)
GLUCOSE UR STRIP-MCNC: NEGATIVE MG/DL
HCO3 SERPL-SCNC: 22 MMOL/L (ref 22–29)
HCT VFR BLD AUTO: 37.7 % (ref 40–53)
HGB BLD-MCNC: 12.9 G/DL (ref 13.3–17.7)
HGB UR QL STRIP: NEGATIVE
HOLD SPECIMEN: NORMAL
INR PPP: 0.9 (ref 0.85–1.15)
KETONES UR STRIP-MCNC: NEGATIVE MG/DL
LACTATE SERPL-SCNC: 1.2 MMOL/L (ref 0.7–2)
LEUKOCYTE ESTERASE UR QL STRIP: NEGATIVE
LIPASE SERPL-CCNC: 53 U/L (ref 13–60)
LYMPHOCYTES # BLD MANUAL: 9.3 10E3/UL (ref 0.8–5.3)
LYMPHOCYTES NFR BLD MANUAL: 64 %
MAGNESIUM SERPL-MCNC: 2.3 MG/DL (ref 1.7–2.3)
MCH RBC QN AUTO: 28.5 PG (ref 26.5–33)
MCHC RBC AUTO-ENTMCNC: 34.2 G/DL (ref 31.5–36.5)
MCV RBC AUTO: 83 FL (ref 78–100)
MONOCYTES # BLD MANUAL: 1.8 10E3/UL (ref 0–1.3)
MONOCYTES NFR BLD MANUAL: 12 %
NEUTROPHILS # BLD MANUAL: 3.4 10E3/UL (ref 1.6–8.3)
NEUTROPHILS NFR BLD MANUAL: 23 %
NITRATE UR QL: NEGATIVE
PH UR STRIP: 7 [PH] (ref 4.7–8)
PLAT MORPH BLD: ABNORMAL
PLATELET # BLD AUTO: 342 10E3/UL (ref 150–450)
POTASSIUM SERPL-SCNC: 4.3 MMOL/L (ref 3.4–5.3)
PROT SERPL-MCNC: 7.6 G/DL (ref 6.4–8.3)
RBC # BLD AUTO: 4.52 10E6/UL (ref 4.4–5.9)
RBC MORPH BLD: ABNORMAL
RBC URINE: <1 /HPF
RSV RNA SPEC NAA+PROBE: NEGATIVE
SARS-COV-2 RNA RESP QL NAA+PROBE: NEGATIVE
SMUDGE CELLS BLD QL SMEAR: PRESENT
SODIUM SERPL-SCNC: 134 MMOL/L (ref 135–145)
SP GR UR STRIP: 1 (ref 1–1.03)
SQUAMOUS EPITHELIAL: 0 /HPF
UROBILINOGEN UR STRIP-MCNC: NORMAL MG/DL
VARIANT LYMPHS BLD QL SMEAR: PRESENT
WBC # BLD AUTO: 14.6 10E3/UL (ref 4–11)
WBC URINE: <1 /HPF

## 2025-02-27 PROCEDURE — 85007 BL SMEAR W/DIFF WBC COUNT: CPT | Performed by: EMERGENCY MEDICINE

## 2025-02-27 PROCEDURE — 83690 ASSAY OF LIPASE: CPT | Performed by: EMERGENCY MEDICINE

## 2025-02-27 PROCEDURE — 74177 CT ABD & PELVIS W/CONTRAST: CPT

## 2025-02-27 PROCEDURE — 82310 ASSAY OF CALCIUM: CPT | Performed by: EMERGENCY MEDICINE

## 2025-02-27 PROCEDURE — 82040 ASSAY OF SERUM ALBUMIN: CPT | Performed by: EMERGENCY MEDICINE

## 2025-02-27 PROCEDURE — 80143 DRUG ASSAY ACETAMINOPHEN: CPT | Performed by: EMERGENCY MEDICINE

## 2025-02-27 PROCEDURE — 86140 C-REACTIVE PROTEIN: CPT | Performed by: EMERGENCY MEDICINE

## 2025-02-27 PROCEDURE — 36415 COLL VENOUS BLD VENIPUNCTURE: CPT | Performed by: EMERGENCY MEDICINE

## 2025-02-27 PROCEDURE — 87637 SARSCOV2&INF A&B&RSV AMP PRB: CPT | Performed by: EMERGENCY MEDICINE

## 2025-02-27 PROCEDURE — 83735 ASSAY OF MAGNESIUM: CPT | Performed by: EMERGENCY MEDICINE

## 2025-02-27 PROCEDURE — 82140 ASSAY OF AMMONIA: CPT | Performed by: EMERGENCY MEDICINE

## 2025-02-27 PROCEDURE — 86704 HEP B CORE ANTIBODY TOTAL: CPT | Performed by: EMERGENCY MEDICINE

## 2025-02-27 PROCEDURE — 86038 ANTINUCLEAR ANTIBODIES: CPT | Performed by: EMERGENCY MEDICINE

## 2025-02-27 PROCEDURE — 86015 ACTIN ANTIBODY EACH: CPT | Performed by: EMERGENCY MEDICINE

## 2025-02-27 PROCEDURE — 85610 PROTHROMBIN TIME: CPT | Performed by: EMERGENCY MEDICINE

## 2025-02-27 PROCEDURE — 76705 ECHO EXAM OF ABDOMEN: CPT

## 2025-02-27 PROCEDURE — 71260 CT THORAX DX C+: CPT

## 2025-02-27 PROCEDURE — 87799 DETECT AGENT NOS DNA QUANT: CPT | Performed by: EMERGENCY MEDICINE

## 2025-02-27 PROCEDURE — 83605 ASSAY OF LACTIC ACID: CPT | Performed by: EMERGENCY MEDICINE

## 2025-02-27 PROCEDURE — 86381 MITOCHONDRIAL ANTIBODY EACH: CPT | Performed by: EMERGENCY MEDICINE

## 2025-02-27 PROCEDURE — 99285 EMERGENCY DEPT VISIT HI MDM: CPT | Performed by: EMERGENCY MEDICINE

## 2025-02-27 PROCEDURE — 99285 EMERGENCY DEPT VISIT HI MDM: CPT | Mod: 25

## 2025-02-27 PROCEDURE — 85014 HEMATOCRIT: CPT | Performed by: EMERGENCY MEDICINE

## 2025-02-27 PROCEDURE — 81001 URINALYSIS AUTO W/SCOPE: CPT | Performed by: EMERGENCY MEDICINE

## 2025-02-27 PROCEDURE — 250N000011 HC RX IP 250 OP 636: Performed by: EMERGENCY MEDICINE

## 2025-02-27 PROCEDURE — 80074 ACUTE HEPATITIS PANEL: CPT | Performed by: EMERGENCY MEDICINE

## 2025-02-27 PROCEDURE — 84155 ASSAY OF PROTEIN SERUM: CPT | Performed by: EMERGENCY MEDICINE

## 2025-02-27 PROCEDURE — 82248 BILIRUBIN DIRECT: CPT | Performed by: EMERGENCY MEDICINE

## 2025-02-27 PROCEDURE — 86256 FLUORESCENT ANTIBODY TITER: CPT | Performed by: EMERGENCY MEDICINE

## 2025-02-27 RX ORDER — IOPAMIDOL 755 MG/ML
126 INJECTION, SOLUTION INTRAVASCULAR ONCE
Status: COMPLETED | OUTPATIENT
Start: 2025-02-27 | End: 2025-02-27

## 2025-02-27 RX ADMIN — IOPAMIDOL 126 ML: 755 INJECTION, SOLUTION INTRAVENOUS at 19:30

## 2025-02-27 ASSESSMENT — ACTIVITIES OF DAILY LIVING (ADL)
ADLS_ACUITY_SCORE: 41

## 2025-02-27 NOTE — ED NOTES
"Patient presents after being sick two weeks ago. He states he took a full round of antibiotics that finished on Sunday and thought he was feeling better so went to work on Monday and states when he got home he's tired and sore so he took ibuprofen and passed out and woke up all sweaty. He states it happen on Tuesday as well. Then [facility] and he was told to get to the ER to get labs checked. He states after the ibuprofen his eyes were yellow and he was neon appearing. States generalized body aches at this time. He was also telling this nurse that his urine in the morning was looking like applesauce and is dark or neon appearing. Patient does endorse drinking 3 large containers of water a day, \"I get my water in\"  "

## 2025-02-27 NOTE — ED TRIAGE NOTES
States he started to notice his face was looking yellow on Sunday and last night his eyes were turning yellow. States he also has generalize body aches. Has not had any liver issues before. Does drink but not that much. States has not had any drinks in last 3 weeks but in last 6 months about 2-3 a week.     Triage Assessment (Adult)       Row Name 02/27/25 7248          Triage Assessment    Airway WDL WDL        Respiratory WDL    Respiratory WDL WDL        Skin Circulation/Temperature WDL    Skin Circulation/Temperature WDL WDL        Cardiac WDL    Cardiac WDL WDL        Peripheral/Neurovascular WDL    Peripheral Neurovascular WDL WDL     Capillary Refill, General less than/equal to 3 secs        Cognitive/Neuro/Behavioral WDL    Cognitive/Neuro/Behavioral WDL WDL

## 2025-02-28 ENCOUNTER — LAB (OUTPATIENT)
Dept: LAB | Facility: HOSPITAL | Age: 45
End: 2025-02-28
Payer: COMMERCIAL

## 2025-02-28 DIAGNOSIS — R74.8 ELEVATED LIVER ENZYMES: ICD-10-CM

## 2025-02-28 DIAGNOSIS — R17 JAUNDICE: ICD-10-CM

## 2025-02-28 DIAGNOSIS — B17.9 ACUTE HEPATITIS: ICD-10-CM

## 2025-02-28 LAB
ALBUMIN SERPL BCG-MCNC: 3.3 G/DL (ref 3.5–5.2)
ALP SERPL-CCNC: 478 U/L (ref 40–150)
ALT SERPL W P-5'-P-CCNC: 566 U/L (ref 0–70)
ANION GAP SERPL CALCULATED.3IONS-SCNC: 13 MMOL/L (ref 7–15)
AST SERPL W P-5'-P-CCNC: 437 U/L (ref 0–45)
BASOPHILS # BLD MANUAL: 0 10E3/UL (ref 0–0.2)
BASOPHILS NFR BLD MANUAL: 0 %
BILIRUB DIRECT SERPL-MCNC: 2.53 MG/DL (ref 0–0.3)
BILIRUB SERPL-MCNC: 3.5 MG/DL
BUN SERPL-MCNC: 10.7 MG/DL (ref 6–20)
CALCIUM SERPL-MCNC: 9.1 MG/DL (ref 8.8–10.4)
CHLORIDE SERPL-SCNC: 98 MMOL/L (ref 98–107)
CREAT SERPL-MCNC: 0.88 MG/DL (ref 0.67–1.17)
EGFRCR SERPLBLD CKD-EPI 2021: >90 ML/MIN/1.73M2
EOSINOPHIL # BLD MANUAL: 0.1 10E3/UL (ref 0–0.7)
EOSINOPHIL NFR BLD MANUAL: 1 %
ERYTHROCYTE [DISTWIDTH] IN BLOOD BY AUTOMATED COUNT: 16.1 % (ref 10–15)
GLUCOSE SERPL-MCNC: 171 MG/DL (ref 70–99)
HAV IGM SERPL QL IA: NONREACTIVE
HBV CORE AB SERPL QL IA: NONREACTIVE
HBV CORE IGM SERPL QL IA: NONREACTIVE
HBV SURFACE AG SERPL QL IA: NONREACTIVE
HCO3 SERPL-SCNC: 22 MMOL/L (ref 22–29)
HCT VFR BLD AUTO: 37.7 % (ref 40–53)
HCV AB SERPL QL IA: NONREACTIVE
HGB BLD-MCNC: 12.9 G/DL (ref 13.3–17.7)
HOLD SPECIMEN: NORMAL
LYMPHOCYTES # BLD MANUAL: 9.3 10E3/UL (ref 0.8–5.3)
LYMPHOCYTES NFR BLD MANUAL: 64 %
MCH RBC QN AUTO: 28.5 PG (ref 26.5–33)
MCHC RBC AUTO-ENTMCNC: 34.2 G/DL (ref 31.5–36.5)
MCV RBC AUTO: 83 FL (ref 78–100)
MONOCYTES # BLD MANUAL: 1.8 10E3/UL (ref 0–1.3)
MONOCYTES NFR BLD MANUAL: 12 %
NEUTROPHILS # BLD MANUAL: 3.4 10E3/UL (ref 1.6–8.3)
NEUTROPHILS NFR BLD MANUAL: 23 %
PATH REV: ABNORMAL
PLAT MORPH BLD: ABNORMAL
PLATELET # BLD AUTO: 342 10E3/UL (ref 150–450)
POTASSIUM SERPL-SCNC: 4.5 MMOL/L (ref 3.4–5.3)
PROT SERPL-MCNC: 7.7 G/DL (ref 6.4–8.3)
RBC # BLD AUTO: 4.52 10E6/UL (ref 4.4–5.9)
RBC MORPH BLD: ABNORMAL
SMUDGE CELLS BLD QL SMEAR: PRESENT
SODIUM SERPL-SCNC: 133 MMOL/L (ref 135–145)
VARIANT LYMPHS BLD QL SMEAR: PRESENT
WBC # BLD AUTO: 14.6 10E3/UL (ref 4–11)

## 2025-02-28 PROCEDURE — 82248 BILIRUBIN DIRECT: CPT

## 2025-02-28 PROCEDURE — 82374 ASSAY BLOOD CARBON DIOXIDE: CPT

## 2025-02-28 PROCEDURE — 82310 ASSAY OF CALCIUM: CPT

## 2025-02-28 PROCEDURE — 36415 COLL VENOUS BLD VENIPUNCTURE: CPT

## 2025-02-28 NOTE — ED PROVIDER NOTES
Patient was signed out to me by Dr. Thacker.  Please see his note for initial ED course.  At the time of signout, imaging was pending as well as disposition.    Ultrasound of the right upper quadrant is negative for signs of biliary obstruction, cholecystitis, cholelithiasis.  CT abdomen pelvis is also negative for causes for jaundice and elevated LFTs.    Spoke with hepatology at the [facility], Dr. Bibiana Love.  She recommended several labs including acetaminophen, viral hepatitis labs, COVID, autoimmune labs including ROSEMARY, anti-smooth muscle, antimitochondrial antibody.  The labs were ordered and drawn.  Many of these labs are not done locally in the blood test to be sent to the emergency [facility].    I spoke with the patient.  [facility]  Patient states he is able to return to the hospital for repeat labs tomorrow.  Dr. Love recommended repeat LFTs tomorrow.  Patient is able to see all of his lab results through [facility].  Dr. Love indicated that she would be able to follow-up with the patient tomorrow after his labs are rechecked.  Patient also will look for the results through [facility].  He was advised to return to the emergency department tomorrow if the alkaline phosphatase, ALT, AST have increased.  Patient was also advised to make an appointment to follow-up with his primary care provider for coordination of care and follow-up.  He feels well enough to go home tonight.  He has called in sick for work tomorrow and will return for repeat labs in the morning.  At this time, patient appears to have an acute hepatitis, unclear etiology.  Acetaminophen is negative, viral and autoimmune testing is pending.  Will also order outpatient right upper quadrant ultrasound with Doppler.     Eden Estrada MD  02/27/25 [id number]

## 2025-02-28 NOTE — ED PROVIDER NOTES
History     Chief Complaint   Patient presents with    Jaundice     HPI  Frankie Troncoso is a 45 year old male who presented with development of jaundice over the last few days.  He was seen 10 days ago for sinusitis started on Augmentin those symptoms of finally resolved but he started to develop jaundice and icterus general myalgias and fatigue.  Has not had any fevers some sweats.  He is not a habitual alcohol drinker.  He has not been having any episodes of biliary colic.  No previous abdominal surgeries he is otherwise asymptomatic.  He has had hepatitis vaccine series.    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    Past Surgical History:    No past surgical history on file.    Family History:    Family History   Problem Relation Age of Onset    Alcohol/Drug Father         alcoholism    C.A.D. Father     Cerebrovascular Disease Father         CVA    Allergies Father     Asthma Father     Heart Disease Other         heart disease - family h/o       Social History:  Marital Status:   [2]  Social History     Tobacco Use    Smoking status: Former      "Current packs/day: 2.00     Average packs/day: 2.0 packs/day for 10.0 years (20.0 ttl pk-yrs)     Types: Cigarettes    Smokeless tobacco: Never   Vaping Use    Vaping status: Never Used   Substance Use Topics    Alcohol use: Yes     Comment: 6 a week if off    Drug use: No        Medications:    albuterol (PROAIR HFA, PROVENTIL HFA, VENTOLIN HFA) 108 (90 BASE) MCG/ACT inhaler  CINNAMON PO  Continuous Blood Gluc Transmit (DEXCOM G6 TRANSMITTER) MISC  EPINEPHrine (ANY BX GENERIC EQUIV) 0.3 MG/0.3ML injection 2-pack  fish oil-omega-3 fatty acids 1000 MG capsule  insulin aspart (NOVOLOG VIAL) 100 UNITS/ML vial  INSULIN PUMP - OUTPATIENT  losartan (COZAAR) 50 MG tablet  omeprazole (PRILOSEC) 20 MG DR capsule  prazosin (MINIPRESS) 2 MG capsule  simvastatin (ZOCOR) 40 MG tablet          Review of Systems   All other systems reviewed and are negative.      Physical Exam   BP: (!) 158/98  Pulse: 104  Temp: 99.3  F (37.4  C)  Resp: 18  Height: 175.3 cm (5' 9\")  Weight: 117 kg (258 lb)  SpO2: 97 %      Physical Exam  Vitals and nursing note reviewed.   Constitutional:       General: He is not in acute distress.     Appearance: Normal appearance. He is obese. He is not ill-appearing, toxic-appearing or diaphoretic.   HENT:      Head: Normocephalic and atraumatic.      Right Ear: External ear normal.      Left Ear: External ear normal.      Nose: Nose normal.      Mouth/Throat:      Mouth: Mucous membranes are moist.      Pharynx: Oropharynx is clear.   Eyes:      General: No scleral icterus.     Extraocular Movements: Extraocular movements intact.      Comments: Icterus   Cardiovascular:      Rate and Rhythm: Normal rate and regular rhythm.      Pulses: Normal pulses.      Heart sounds: Normal heart sounds.   Pulmonary:      Effort: Pulmonary effort is normal. No respiratory distress.      Breath sounds: Normal breath sounds.   Abdominal:      General: Bowel sounds are normal.      Palpations: Abdomen is soft.      Tenderness: " There is abdominal tenderness. There is no guarding or rebound.      Comments: Tender right upper quadrant   Musculoskeletal:         General: No deformity. Normal range of motion.      Cervical back: Normal range of motion and neck supple.      Right lower leg: No edema.      Left lower leg: No edema.   Skin:     General: Skin is warm and dry.      Capillary Refill: Capillary refill takes less than 2 seconds.      Comments: Jaundice   Neurological:      General: No focal deficit present.      Mental Status: He is alert and oriented to person, place, and time. Mental status is at baseline.   Psychiatric:         Mood and Affect: Mood normal.         Behavior: Behavior normal.         Thought Content: Thought content normal.         Judgment: Judgment normal.         ED Course        Procedures              Critical Care time:  none     None         Results for orders placed or performed during the hospital encounter of 02/27/25 (from the past 24 hours)   CBC with platelets differential    Narrative    The following orders were created for panel order CBC with platelets differential.  Procedure                               Abnormality         Status                     ---------                               -----------         ------                     CBC with platelets and d...[[id number]]  Abnormal            Preliminary result         RBC and Platelet Morphology[[id number]]                                                 Manual Differential[[id number]]          Abnormal            Preliminary result           Please view results for these tests on the individual orders.   Comprehensive metabolic panel   Result Value Ref Range    Sodium 134 (L) 135 - 145 mmol/L    Potassium 4.3 3.4 - 5.3 mmol/L    Carbon Dioxide (CO2) 22 22 - 29 mmol/L    Anion Gap 13 7 - 15 mmol/L    Urea Nitrogen 9.8 6.0 - 20.0 mg/dL    Creatinine 0.87 0.67 - 1.17 mg/dL    GFR Estimate >90 >60 mL/min/1.73m2    Calcium 9.5 8.8 - 10.4 mg/dL     Chloride 99 98 - 107 mmol/L    Glucose 121 (H) 70 - 99 mg/dL    Alkaline Phosphatase 473 (H) 40 - 150 U/L     (HH) 0 - 45 U/L     (HH) 0 - 70 U/L    Protein Total 7.6 6.4 - 8.3 g/dL    Albumin 3.2 (L) 3.5 - 5.2 g/dL    Bilirubin Total 4.3 (H) <=1.2 mg/dL   [place] Draw    Narrative    The following orders were created for panel order [place] Draw.  Procedure                               Abnormality         Status                     ---------                               -----------         ------                     Extra Red Top Tube[[id number]]                               Final result               Extra Green Top (Lithium...[[id number]]                                                 Extra Purple Top Tube[[id number]]                                                         Please view results for these tests on the individual orders.   Lipase   Result Value Ref Range    Lipase 53 13 - 60 U/L   Bilirubin direct   Result Value Ref Range    Bilirubin Direct 3.14 (H) 0.00 - 0.30 mg/dL   CBC with platelets and differential   Result Value Ref Range    WBC Count 14.6 (H) 4.0 - 11.0 10e3/uL    RBC Count 4.52 4.40 - 5.90 10e6/uL    Hemoglobin 12.9 (L) 13.3 - 17.7 g/dL    Hematocrit 37.7 (L) 40.0 - 53.0 %    MCV 83 78 - 100 fL    MCH 28.5 26.5 - 33.0 pg    MCHC 34.2 31.5 - 36.5 g/dL    RDW 16.1 (H) 10.0 - 15.0 %    Platelet Count 342 150 - 450 10e3/uL    Narrative    Sent for review by Pathologist.  See Pathologist comments after review.       Extra Red Top Tube   Result Value Ref Range    Hold Specimen JIC    Extra Tube    Narrative    The following orders were created for panel order Extra Tube.  Procedure                               Abnormality         Status                     ---------                               -----------         ------                     Extra Blue Top Tube[[id number]]                              Final result                 Please view results for these tests on the  individual orders.   Extra Blue Top Tube   Result Value Ref Range    Hold Specimen JIC    Extra Tube    Narrative    The following orders were created for panel order Extra Tube.  Procedure                               Abnormality         Status                     ---------                               -----------         ------                     Extra Heparinized Syringe[[id number]]                        Final result                 Please view results for these tests on the individual orders.   Extra Heparinized Syringe   Result Value Ref Range    Hold Specimen JIC    INR   Result Value Ref Range    INR 0.90 0.85 - 1.15   Magnesium   Result Value Ref Range    Magnesium 2.3 1.7 - 2.3 mg/dL   CRP inflammation   Result Value Ref Range    CRP Inflammation 31.77 (H) <5.00 mg/L   Manual Differential   Result Value Ref Range    % Neutrophils 23 %    % Lymphocytes 64 %    % Monocytes 12 %    % Eosinophils 1 %    % Basophils 0 %    Absolute Neutrophils 3.4 1.6 - 8.3 10e3/uL    Absolute Lymphocytes 9.3 (H) 0.8 - 5.3 10e3/uL    Absolute Monocytes 1.8 (H) 0.0 - 1.3 10e3/uL    Absolute Eosinophils 0.1 0.0 - 0.7 10e3/uL    Absolute Basophils 0.0 0.0 - 0.2 10e3/uL    RBC Morphology Confirmed RBC Indices     Platelet Assessment  Automated Count Confirmed. Platelet morphology is normal.     Automated Count Confirmed. Platelet morphology is normal.    Reactive Lymphocytes Present (A) None Seen    Smudge Cells Present (A) None Seen   Lactic acid whole blood with 1x repeat in 2 hr when >2   Result Value Ref Range    Lactic Acid, Initial 1.2 0.7 - 2.0 mmol/L   Ammonia   Result Value Ref Range    Ammonia 21 16 - 60 umol/L   UA with Microscopic reflex to Culture    Specimen: Urine, Midstream   Result Value Ref Range    Color Urine Yellow Colorless, Straw, Light Yellow, Yellow    Appearance Urine Clear Clear    Glucose Urine Negative Negative mg/dL    Bilirubin Urine Negative Negative    Ketones Urine Negative Negative mg/dL     Specific Gravity Urine 1.005 1.003 - 1.035    Blood Urine Negative Negative    pH Urine 7.0 4.7 - 8.0    Protein Albumin Urine Negative Negative mg/dL    Urobilinogen Urine Normal Normal, 2.0 mg/dL    Nitrite Urine Negative Negative    Leukocyte Esterase Urine Negative Negative    RBC Urine <1 <=2 /HPF    WBC Urine <1 <=5 /HPF    Squamous Epithelials Urine 0 <=1 /HPF    Narrative    Urine Culture not indicated   US Abdomen Limited    Narrative    EXAM: US ABDOMEN LIMITED  LOCATION: [facility]  DATE: 2/27/2025    INDICATION: ruq abdominal pain tenderness jaundice  COMPARISON: CT 11/1/2018  TECHNIQUE: Limited abdominal ultrasound.    FINDINGS:    GALLBLADDER: Partially distended. Prominent pericholecystic fat as seen on comparison CT. No significant wall thickening or edema. No shadowing gallstones.    BILE DUCTS: No biliary dilatation. The common duct measures 3 mm.    LIVER: Mildly enlarged. Normal surface contour and echotexture. No focal mass. The portal vein is patent with flow in the normal direction.    RIGHT KIDNEY: No hydronephrosis.    PANCREAS: Mostly obscured by overlying bowel gas.    No ascites.      Impression    IMPRESSION:  1.  No cholelithiasis and no convincing sonographic evidence for acute cholecystitis.  2.  No biliary ductal dilatation.  3.  Mild hepatomegaly.           Medications   iopamidol (ISOVUE-370) solution 126 mL (126 mLs Intravenous $Given 2/27/25 1930)   sodium chloride (PF) 0.9% PF flush 60 mL (50 mLs Intravenous $Given 2/27/25 1930)       Assessments & Plan (with Medical Decision Making)     I have reviewed the nursing notes.    I have reviewed the findings, diagnosis, plan and need for follow up with the patient.           Medical Decision Making  The patient's presentation was of high complexity (an acute health issue posing potential threat to life or bodily function).    The patient's evaluation involved:  ordering and/or review of 3+ test(s) in this encounter (CT  scan ultrasound multiple lab investigation)    The patient's management necessitated moderate risk (patient presented with painless jaundice right upper quadrant tenderness differential diagnosis may include biliary tree obstruction pancreatic tumor, hepatitis from infection versus autoimmune versus hepatotoxicity from Augmentin, patient required diagnostic imaging and multiple lab investigation).    Care patient transferred at change of shift 1900 hrs. to Dr. Sean Rehman Prescriptions    No medications on file       Final diagnoses:   Jaundice       2/27/2025   [facility]       Doron Thacker MD  02/27/25 1931       Doron Thacker MD  02/27/25 1931

## 2025-02-28 NOTE — DISCHARGE INSTRUCTIONS
Please make an appointment to follow-up with your primary care provider next week.  Please come to the [facility] lab for blood draw tomorrow morning to recheck your liver enzymes.  You should be getting a phone call from the [facility] hepatologist regarding the results tomorrow.  I suggest that if you do not get a phone call within 2 to 3 hours after the lab results are available, and if you can see that the numbers for the alkaline phosphatase, ALT, AST have increased, then I recommend coming back to the emergency room tomorrow.

## 2025-02-28 NOTE — ED NOTES
Face to face report given with opportunity to observe patient.    Report given to Catherine Montilla RN   2/27/2025  7:15 PM

## 2025-03-01 LAB
CMV DNA SPEC NAA+PROBE-ACNC: NOT DETECTED IU/ML
SPECIMEN TYPE: NORMAL

## 2025-03-02 LAB
EBV DNA SERPL NAA+PROBE-ACNC: 488 IU/ML
EBV DNA SERPL NAA+PROBE-LOG#: 2.7 {LOG_COPIES}/ML
SMA IGG SER-ACNC: 30 UNITS

## 2025-03-03 ENCOUNTER — APPOINTMENT (OUTPATIENT)
Dept: LAB | Facility: HOSPITAL | Age: 45
End: 2025-03-03
Payer: COMMERCIAL

## 2025-03-03 LAB
ALBUMIN SERPL BCG-MCNC: 3.7 G/DL (ref 3.5–5.2)
ALP SERPL-CCNC: 398 U/L (ref 40–150)
ALT SERPL W P-5'-P-CCNC: 426 U/L (ref 0–70)
ANA SER QL IF: NEGATIVE
AST SERPL W P-5'-P-CCNC: 288 U/L (ref 0–45)
BILIRUB DIRECT SERPL-MCNC: 1.32 MG/DL (ref 0–0.3)
BILIRUB SERPL-MCNC: 1.9 MG/DL
PROT SERPL-MCNC: 8.1 G/DL (ref 6.4–8.3)
SMOOTH MUSCLE IGG TITR SER: ABNORMAL {TITER}

## 2025-03-03 PROCEDURE — 80076 HEPATIC FUNCTION PANEL: CPT

## 2025-03-03 PROCEDURE — 36415 COLL VENOUS BLD VENIPUNCTURE: CPT

## 2025-03-06 LAB — MITOCHONDRIA M2 IGG SER-ACNC: 1.7 U/ML

## 2025-03-09 ENCOUNTER — HEALTH MAINTENANCE LETTER (OUTPATIENT)
Age: 45
End: 2025-03-09

## 2025-06-22 ENCOUNTER — HEALTH MAINTENANCE LETTER (OUTPATIENT)
Age: 45
End: 2025-06-22

## 2025-07-13 ENCOUNTER — HEALTH MAINTENANCE LETTER (OUTPATIENT)
Age: 45
End: 2025-07-13